# Patient Record
Sex: MALE
[De-identification: names, ages, dates, MRNs, and addresses within clinical notes are randomized per-mention and may not be internally consistent; named-entity substitution may affect disease eponyms.]

---

## 2020-03-16 ENCOUNTER — NURSE TRIAGE (OUTPATIENT)
Dept: OTHER | Facility: CLINIC | Age: 65
End: 2020-03-16

## 2020-03-16 NOTE — TELEPHONE ENCOUNTER
Reason for Disposition   Fever with no signs of serious infection or localizing symptoms    Protocols used: FEVER-ADULT-[place]    Pt calling c/o low grade temp. Over the past week temp has ranged from 97-99. Today temp is at 100. States he feels just fine. No other symptoms, no recent travel. Pt asking if ok to go to work, recommend he contact his employer. Increase fluids, call PCP if temp increases or any new symptoms.

## 2023-03-29 DIAGNOSIS — J45.909 UNCOMPLICATED ASTHMA, UNSPECIFIED ASTHMA SEVERITY, UNSPECIFIED WHETHER PERSISTENT (HHS-HCC): ICD-10-CM

## 2023-03-29 RX ORDER — SILDENAFIL 100 MG/1
TABLET, FILM COATED ORAL
COMMUNITY

## 2023-03-29 RX ORDER — CLOPIDOGREL BISULFATE 75 MG/1
75 TABLET ORAL DAILY
COMMUNITY
End: 2023-10-31

## 2023-03-29 RX ORDER — ASPIRIN 81 MG/1
1 TABLET ORAL DAILY
COMMUNITY

## 2023-03-29 RX ORDER — GLUCOSAM/CHONDRO/HERB 149/HYAL 750-100 MG
TABLET ORAL
COMMUNITY

## 2023-03-29 RX ORDER — CEPHALEXIN 250 MG/1
1 CAPSULE ORAL EVERY 12 HOURS
COMMUNITY
End: 2023-03-29 | Stop reason: SDUPTHER

## 2023-03-29 RX ORDER — ATORVASTATIN CALCIUM 80 MG/1
80 TABLET, FILM COATED ORAL DAILY
COMMUNITY
End: 2023-08-01 | Stop reason: SDUPTHER

## 2023-03-29 RX ORDER — GABAPENTIN 300 MG/1
1 CAPSULE ORAL NIGHTLY
COMMUNITY
Start: 2021-09-16

## 2023-03-29 RX ORDER — NITROGLYCERIN 0.4 MG/1
TABLET SUBLINGUAL
COMMUNITY
Start: 2015-06-02

## 2023-03-29 RX ORDER — VIT C/E/ZN/COPPR/LUTEIN/ZEAXAN 250MG-90MG
CAPSULE ORAL
COMMUNITY

## 2023-03-29 RX ORDER — LEVOTHYROXINE SODIUM 100 UG/1
TABLET ORAL
COMMUNITY

## 2023-03-29 RX ORDER — METOPROLOL TARTRATE 25 MG/1
25 TABLET, FILM COATED ORAL 2 TIMES DAILY
COMMUNITY

## 2023-03-30 RX ORDER — FLUTICASONE PROPIONATE AND SALMETEROL 100; 50 UG/1; UG/1
1 POWDER RESPIRATORY (INHALATION) EVERY 12 HOURS
Qty: 60 EACH | Refills: 3 | Status: SHIPPED | OUTPATIENT
Start: 2023-03-30 | End: 2023-12-08 | Stop reason: SDUPTHER

## 2023-06-17 LAB — PROSTATE SPECIFIC AG (NG/ML) IN SER/PLAS: 3.85 NG/ML (ref 0–4)

## 2023-08-01 ENCOUNTER — OFFICE VISIT (OUTPATIENT)
Dept: PRIMARY CARE | Facility: CLINIC | Age: 68
End: 2023-08-01
Payer: MEDICARE

## 2023-08-01 VITALS
SYSTOLIC BLOOD PRESSURE: 104 MMHG | WEIGHT: 192 LBS | HEIGHT: 70 IN | HEART RATE: 61 BPM | OXYGEN SATURATION: 97 % | RESPIRATION RATE: 16 BRPM | BODY MASS INDEX: 27.49 KG/M2 | DIASTOLIC BLOOD PRESSURE: 63 MMHG

## 2023-08-01 DIAGNOSIS — E78.5 HYPERLIPIDEMIA, UNSPECIFIED HYPERLIPIDEMIA TYPE: ICD-10-CM

## 2023-08-01 DIAGNOSIS — Z00.00 HEALTHCARE MAINTENANCE: Primary | ICD-10-CM

## 2023-08-01 DIAGNOSIS — I25.10 CORONARY ARTERY DISEASE INVOLVING NATIVE HEART WITHOUT ANGINA PECTORIS, UNSPECIFIED VESSEL OR LESION TYPE: ICD-10-CM

## 2023-08-01 PROBLEM — M17.0 OSTEOARTHRITIS OF BOTH KNEES: Status: ACTIVE | Noted: 2023-08-01

## 2023-08-01 PROBLEM — E03.9 HYPOTHYROIDISM: Status: ACTIVE | Noted: 2023-08-01

## 2023-08-01 PROBLEM — N52.9 ED (ERECTILE DYSFUNCTION): Status: ACTIVE | Noted: 2023-08-01

## 2023-08-01 PROBLEM — M25.571 RIGHT ANKLE PAIN: Status: ACTIVE | Noted: 2023-08-01

## 2023-08-01 PROBLEM — R73.9 HYPERGLYCEMIA: Status: ACTIVE | Noted: 2023-08-01

## 2023-08-01 PROBLEM — M70.21 OLECRANON BURSITIS OF RIGHT ELBOW: Status: ACTIVE | Noted: 2023-08-01

## 2023-08-01 PROBLEM — T78.40XA ALLERGIC REACTION: Status: ACTIVE | Noted: 2023-08-01

## 2023-08-01 PROBLEM — M54.59 MECHANICAL LOW BACK PAIN: Status: ACTIVE | Noted: 2023-08-01

## 2023-08-01 PROBLEM — R35.0 FREQUENT URINATION: Status: ACTIVE | Noted: 2023-08-01

## 2023-08-01 PROBLEM — G89.29 CHRONIC PAIN: Status: ACTIVE | Noted: 2023-08-01

## 2023-08-01 PROBLEM — S93.491A SPRAIN OF ANTERIOR TALOFIBULAR LIGAMENT OF RIGHT ANKLE: Status: ACTIVE | Noted: 2023-08-01

## 2023-08-01 PROBLEM — Q74.2 ABNORMALITY OF FEMUR: Status: ACTIVE | Noted: 2023-08-01

## 2023-08-01 PROBLEM — M25.532 PAIN AND SWELLING OF LEFT WRIST: Status: ACTIVE | Noted: 2023-08-01

## 2023-08-01 PROBLEM — L03.113 CELLULITIS OF RIGHT UPPER EXTREMITY: Status: ACTIVE | Noted: 2023-08-01

## 2023-08-01 PROBLEM — R97.20 ELEVATED PSA: Status: ACTIVE | Noted: 2023-08-01

## 2023-08-01 PROBLEM — M17.9 OSTEOARTHROSIS OF KNEE: Status: ACTIVE | Noted: 2023-08-01

## 2023-08-01 PROBLEM — M25.521 RIGHT ELBOW PAIN: Status: ACTIVE | Noted: 2023-08-01

## 2023-08-01 PROBLEM — H57.89 EYE REDNESS: Status: ACTIVE | Noted: 2023-08-01

## 2023-08-01 PROBLEM — R05.9 COUGH: Status: ACTIVE | Noted: 2023-08-01

## 2023-08-01 PROBLEM — M43.10 ACQUIRED SPONDYLOLISTHESIS: Status: ACTIVE | Noted: 2023-08-01

## 2023-08-01 PROBLEM — N41.9 PROSTATITIS: Status: ACTIVE | Noted: 2023-08-01

## 2023-08-01 PROBLEM — M48.061 LUMBAR SPINAL STENOSIS: Status: ACTIVE | Noted: 2023-08-01

## 2023-08-01 PROBLEM — R53.83 FATIGUE: Status: ACTIVE | Noted: 2023-08-01

## 2023-08-01 PROBLEM — M25.569 PAIN IN JOINT, LOWER LEG: Status: ACTIVE | Noted: 2023-08-01

## 2023-08-01 PROBLEM — S50.12XA CONTUSION OF LEFT FOREARM: Status: ACTIVE | Noted: 2023-08-01

## 2023-08-01 PROBLEM — Z96.60 H/O JOINT REPLACEMENT: Status: ACTIVE | Noted: 2023-08-01

## 2023-08-01 PROBLEM — R81 GLYCOSURIA: Status: ACTIVE | Noted: 2023-08-01

## 2023-08-01 PROBLEM — M71.20 BAKER CYST: Status: ACTIVE | Noted: 2023-08-01

## 2023-08-01 PROBLEM — R07.9 CHEST PAIN: Status: ACTIVE | Noted: 2023-08-01

## 2023-08-01 PROBLEM — M25.432 PAIN AND SWELLING OF LEFT WRIST: Status: ACTIVE | Noted: 2023-08-01

## 2023-08-01 PROBLEM — E55.9 VITAMIN D DEFICIENCY: Status: ACTIVE | Noted: 2023-08-01

## 2023-08-01 PROBLEM — M53.3 SACRO-ILIAC PAIN: Status: ACTIVE | Noted: 2023-08-01

## 2023-08-01 PROBLEM — G47.00 INSOMNIA: Status: ACTIVE | Noted: 2023-08-01
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
## 2023-08-01 PROBLEM — M19.042 ARTHRITIS OF HAND, LEFT, DEGENERATIVE: Status: ACTIVE | Noted: 2023-08-01

## 2023-08-01 PROBLEM — J45.909 ASTHMA (HHS-HCC): Status: ACTIVE | Noted: 2023-08-01

## 2023-08-01 PROBLEM — M54.16 LEFT LUMBAR RADICULOPATHY: Status: ACTIVE | Noted: 2023-08-01

## 2023-08-01 PROCEDURE — 1125F AMNT PAIN NOTED PAIN PRSNT: CPT | Performed by: INTERNAL MEDICINE

## 2023-08-01 PROCEDURE — 1036F TOBACCO NON-USER: CPT | Performed by: INTERNAL MEDICINE

## 2023-08-01 PROCEDURE — G0439 PPPS, SUBSEQ VISIT: HCPCS | Performed by: INTERNAL MEDICINE

## 2023-08-01 PROCEDURE — 1159F MED LIST DOCD IN RCRD: CPT | Performed by: INTERNAL MEDICINE

## 2023-08-01 PROCEDURE — 1170F FXNL STATUS ASSESSED: CPT | Performed by: INTERNAL MEDICINE

## 2023-08-01 RX ORDER — ATORVASTATIN CALCIUM 80 MG/1
80 TABLET, FILM COATED ORAL DAILY
Qty: 90 TABLET | Refills: 3 | Status: SHIPPED | OUTPATIENT
Start: 2023-08-01 | End: 2024-02-22 | Stop reason: SDUPTHER

## 2023-08-01 RX ORDER — TADALAFIL 5 MG/1
5 TABLET ORAL DAILY
COMMUNITY
Start: 2023-06-23 | End: 2024-01-09 | Stop reason: SDUPTHER

## 2023-08-01 RX ORDER — MELOXICAM 15 MG/1
15 TABLET ORAL DAILY PRN
COMMUNITY
Start: 2023-05-18

## 2023-08-01 RX ORDER — TRAMADOL HYDROCHLORIDE 50 MG/1
TABLET ORAL
COMMUNITY
Start: 2023-07-13

## 2023-08-01 ASSESSMENT — ACTIVITIES OF DAILY LIVING (ADL)
BATHING: INDEPENDENT
DOING_HOUSEWORK: INDEPENDENT
MANAGING_FINANCES: INDEPENDENT
GROCERY_SHOPPING: INDEPENDENT
TAKING_MEDICATION: INDEPENDENT
DRESSING: INDEPENDENT

## 2023-08-01 ASSESSMENT — PATIENT HEALTH QUESTIONNAIRE - PHQ9
2. FEELING DOWN, DEPRESSED OR HOPELESS: NOT AT ALL
1. LITTLE INTEREST OR PLEASURE IN DOING THINGS: NOT AT ALL
1. LITTLE INTEREST OR PLEASURE IN DOING THINGS: NOT AT ALL
SUM OF ALL RESPONSES TO PHQ9 QUESTIONS 1 AND 2: 0
SUM OF ALL RESPONSES TO PHQ9 QUESTIONS 1 AND 2: 0
2. FEELING DOWN, DEPRESSED OR HOPELESS: NOT AT ALL

## 2023-08-01 ASSESSMENT — ENCOUNTER SYMPTOMS
LOSS OF SENSATION IN FEET: 0
OCCASIONAL FEELINGS OF UNSTEADINESS: 0
DEPRESSION: 0

## 2023-08-01 NOTE — PROGRESS NOTES
"Subjective   Reason for Visit: Zan Reed is an 67 y.o. male here for a Medicare Wellness visit.     67 M    L hip surgery planned for later this month with Dr Linus MARNIO                 Reviewed all medications by prescribing practitioner or clinical pharmacist (such as prescriptions, OTCs, herbal therapies and supplements) and documented in the medical record.    HPI    Patient Care Team:  Raj Romero MD as PCP - General  Kelly Evans MD PhD as PCP - [facility] Attributed Provider     Review of Systems      No Fever/chills/headaches/dizziness/chest pains/ shortness of breath/palpitations/Nausea/vomiting/diarrhea/ constipation/urine frequency/blood in urine.            Objective   Vitals:  /63 (BP Location: Left arm, Patient Position: Sitting, BP Cuff Size: Adult)   Pulse 61   Resp 16   Ht 1.775 m (5' 9.88\")   Wt 87.1 kg (192 lb)   SpO2 97%   BMI 27.64 kg/m²       Physical Exam    No JVP elevation. No palpable Lymph Nodes. No Thyromegaly    CVS-NL S1/S2 . No MRG    Lungs-CTA. B/S= B/L    Abdomen-Soft, Non-tender. No masses or HSM    Extremities: No C/C/E    No masses or hernia. Normal prostate     Assessment/Plan   Problem List Items Addressed This Visit          Cardiac and Vasculature    Hyperlipidemia - Primary    Relevant Medications    atorvastatin (Lipitor) 80 mg tablet     Other Visit Diagnoses       Healthcare maintenance        Relevant Orders    CBC and Auto Differential    Comprehensive Metabolic Panel    TSH with reflex to Free T4 if abnormal    Lipid Panel    Urinalysis with Reflex Microscopic    Prostate Specific Antigen, Screen    Hemoglobin A1c          67 M    L hip surgery planned for later this month with Dr Linus MARINO    Rhinophyma/Rosacea    Plan:    Labs    Follow up with Cardiology for annual surveillance    Awaiting THR with Dr Barriga later this month    Continue with current Rx    Follow up/ Call with any concerns    Follow up in 12 " months /PRN

## 2023-08-08 ENCOUNTER — LAB (OUTPATIENT)
Dept: LAB | Facility: LAB | Age: 68
End: 2023-08-08
Payer: MEDICARE

## 2023-08-08 DIAGNOSIS — Z00.00 HEALTHCARE MAINTENANCE: ICD-10-CM

## 2023-08-08 LAB
ALANINE AMINOTRANSFERASE (SGPT) (U/L) IN SER/PLAS: 21 U/L (ref 10–52)
ALBUMIN (G/DL) IN SER/PLAS: 4.2 G/DL (ref 3.4–5)
ALKALINE PHOSPHATASE (U/L) IN SER/PLAS: 82 U/L (ref 33–136)
ANION GAP IN SER/PLAS: 10 MMOL/L (ref 10–20)
APPEARANCE, URINE: CLEAR
ASPARTATE AMINOTRANSFERASE (SGOT) (U/L) IN SER/PLAS: 18 U/L (ref 9–39)
BASOPHILS (10*3/UL) IN BLOOD BY AUTOMATED COUNT: 0.01 X10E9/L (ref 0–0.1)
BASOPHILS/100 LEUKOCYTES IN BLOOD BY AUTOMATED COUNT: 0.2 % (ref 0–2)
BILIRUBIN TOTAL (MG/DL) IN SER/PLAS: 0.8 MG/DL (ref 0–1.2)
BILIRUBIN, URINE: NEGATIVE
BLOOD, URINE: NEGATIVE
CALCIUM (MG/DL) IN SER/PLAS: 9.1 MG/DL (ref 8.6–10.3)
CARBON DIOXIDE, TOTAL (MMOL/L) IN SER/PLAS: 30 MMOL/L (ref 21–32)
CHLORIDE (MMOL/L) IN SER/PLAS: 105 MMOL/L (ref 98–107)
CHOLESTEROL (MG/DL) IN SER/PLAS: 97 MG/DL (ref 0–199)
CHOLESTEROL IN HDL (MG/DL) IN SER/PLAS: 38.2 MG/DL
CHOLESTEROL/HDL RATIO: 2.5
COLOR, URINE: YELLOW
CREATININE (MG/DL) IN SER/PLAS: 1.06 MG/DL (ref 0.5–1.3)
EOSINOPHILS (10*3/UL) IN BLOOD BY AUTOMATED COUNT: 0.14 X10E9/L (ref 0–0.7)
EOSINOPHILS/100 LEUKOCYTES IN BLOOD BY AUTOMATED COUNT: 3.2 % (ref 0–6)
ERYTHROCYTE DISTRIBUTION WIDTH (RATIO) BY AUTOMATED COUNT: 13.4 % (ref 11.5–14.5)
ERYTHROCYTE MEAN CORPUSCULAR HEMOGLOBIN CONCENTRATION (G/DL) BY AUTOMATED: 33.4 G/DL (ref 32–36)
ERYTHROCYTE MEAN CORPUSCULAR VOLUME (FL) BY AUTOMATED COUNT: 81 FL (ref 80–100)
ERYTHROCYTES (10*6/UL) IN BLOOD BY AUTOMATED COUNT: 5.34 X10E12/L (ref 4.5–5.9)
ESTIMATED AVERAGE GLUCOSE FOR HBA1C: 111 MG/DL
GFR MALE: 77 ML/MIN/1.73M2
GLUCOSE (MG/DL) IN SER/PLAS: 111 MG/DL (ref 74–99)
GLUCOSE, URINE: ABNORMAL MG/DL
HEMATOCRIT (%) IN BLOOD BY AUTOMATED COUNT: 43.4 % (ref 41–52)
HEMOGLOBIN (G/DL) IN BLOOD: 14.5 G/DL (ref 13.5–17.5)
HEMOGLOBIN A1C/HEMOGLOBIN TOTAL IN BLOOD: 5.5 %
IMMATURE GRANULOCYTES/100 LEUKOCYTES IN BLOOD BY AUTOMATED COUNT: 0.2 % (ref 0–0.9)
KETONES, URINE: NEGATIVE MG/DL
LDL: 40 MG/DL (ref 0–99)
LEUKOCYTE ESTERASE, URINE: NEGATIVE
LEUKOCYTES (10*3/UL) IN BLOOD BY AUTOMATED COUNT: 4.4 X10E9/L (ref 4.4–11.3)
LYMPHOCYTES (10*3/UL) IN BLOOD BY AUTOMATED COUNT: 1.27 X10E9/L (ref 1.2–4.8)
LYMPHOCYTES/100 LEUKOCYTES IN BLOOD BY AUTOMATED COUNT: 29.1 % (ref 13–44)
MONOCYTES (10*3/UL) IN BLOOD BY AUTOMATED COUNT: 0.35 X10E9/L (ref 0.1–1)
MONOCYTES/100 LEUKOCYTES IN BLOOD BY AUTOMATED COUNT: 8 % (ref 2–10)
NEUTROPHILS (10*3/UL) IN BLOOD BY AUTOMATED COUNT: 2.58 X10E9/L (ref 1.2–7.7)
NEUTROPHILS/100 LEUKOCYTES IN BLOOD BY AUTOMATED COUNT: 59.3 % (ref 40–80)
NITRITE, URINE: NEGATIVE
PH, URINE: 6 (ref 5–8)
PLATELETS (10*3/UL) IN BLOOD AUTOMATED COUNT: 138 X10E9/L (ref 150–450)
POTASSIUM (MMOL/L) IN SER/PLAS: 4.3 MMOL/L (ref 3.5–5.3)
PROTEIN TOTAL: 6.6 G/DL (ref 6.4–8.2)
PROTEIN, URINE: NEGATIVE MG/DL
SODIUM (MMOL/L) IN SER/PLAS: 141 MMOL/L (ref 136–145)
SPECIFIC GRAVITY, URINE: 1.02 (ref 1–1.03)
THYROTROPIN (MIU/L) IN SER/PLAS BY DETECTION LIMIT <= 0.05 MIU/L: 2.38 MIU/L (ref 0.44–3.98)
TRIGLYCERIDE (MG/DL) IN SER/PLAS: 95 MG/DL (ref 0–149)
UREA NITROGEN (MG/DL) IN SER/PLAS: 12 MG/DL (ref 6–23)
UROBILINOGEN, URINE: <2 MG/DL (ref 0–1.9)
VLDL: 19 MG/DL (ref 0–40)

## 2023-08-08 PROCEDURE — 81003 URINALYSIS AUTO W/O SCOPE: CPT

## 2023-08-08 PROCEDURE — 84443 ASSAY THYROID STIM HORMONE: CPT

## 2023-08-08 PROCEDURE — 80053 COMPREHEN METABOLIC PANEL: CPT

## 2023-08-08 PROCEDURE — 80061 LIPID PANEL: CPT

## 2023-08-08 PROCEDURE — 83036 HEMOGLOBIN GLYCOSYLATED A1C: CPT

## 2023-08-08 PROCEDURE — 36415 COLL VENOUS BLD VENIPUNCTURE: CPT

## 2023-08-08 PROCEDURE — 85025 COMPLETE CBC W/AUTO DIFF WBC: CPT

## 2023-08-19 LAB — STAPH/MRSA SCREEN, CULTURE: NORMAL

## 2023-08-29 ENCOUNTER — HOSPITAL ENCOUNTER (OUTPATIENT)
Dept: DATA CONVERSION | Facility: HOSPITAL | Age: 68
End: 2023-08-30
Attending: ORTHOPAEDIC SURGERY | Admitting: ORTHOPAEDIC SURGERY
Payer: MEDICARE

## 2023-08-29 DIAGNOSIS — M16.12 UNILATERAL PRIMARY OSTEOARTHRITIS, LEFT HIP: ICD-10-CM

## 2023-08-30 LAB
ANION GAP IN SER/PLAS: 12 MMOL/L (ref 10–20)
BASOPHILS (10*3/UL) IN BLOOD BY AUTOMATED COUNT: 0.01 X10E9/L (ref 0–0.1)
BASOPHILS/100 LEUKOCYTES IN BLOOD BY AUTOMATED COUNT: 0.1 % (ref 0–2)
CALCIUM (MG/DL) IN SER/PLAS: 7.6 MG/DL (ref 8.6–10.3)
CARBON DIOXIDE, TOTAL (MMOL/L) IN SER/PLAS: 25 MMOL/L (ref 21–32)
CHLORIDE (MMOL/L) IN SER/PLAS: 104 MMOL/L (ref 98–107)
CREATININE (MG/DL) IN SER/PLAS: 0.95 MG/DL (ref 0.5–1.3)
EOSINOPHILS (10*3/UL) IN BLOOD BY AUTOMATED COUNT: 0.01 X10E9/L (ref 0–0.7)
EOSINOPHILS/100 LEUKOCYTES IN BLOOD BY AUTOMATED COUNT: 0.1 % (ref 0–6)
ERYTHROCYTE DISTRIBUTION WIDTH (RATIO) BY AUTOMATED COUNT: 13.2 % (ref 11.5–14.5)
ERYTHROCYTE MEAN CORPUSCULAR HEMOGLOBIN CONCENTRATION (G/DL) BY AUTOMATED: 34.3 G/DL (ref 32–36)
ERYTHROCYTE MEAN CORPUSCULAR VOLUME (FL) BY AUTOMATED COUNT: 81 FL (ref 80–100)
ERYTHROCYTES (10*6/UL) IN BLOOD BY AUTOMATED COUNT: 3.83 X10E12/L (ref 4.5–5.9)
GFR MALE: 88 ML/MIN/1.73M2
GLUCOSE (MG/DL) IN SER/PLAS: 155 MG/DL (ref 74–99)
HEMATOCRIT (%) IN BLOOD BY AUTOMATED COUNT: 30.9 % (ref 41–52)
HEMOGLOBIN (G/DL) IN BLOOD: 10.6 G/DL (ref 13.5–17.5)
IMMATURE GRANULOCYTES/100 LEUKOCYTES IN BLOOD BY AUTOMATED COUNT: 0.5 % (ref 0–0.9)
LEUKOCYTES (10*3/UL) IN BLOOD BY AUTOMATED COUNT: 8.3 X10E9/L (ref 4.4–11.3)
LYMPHOCYTES (10*3/UL) IN BLOOD BY AUTOMATED COUNT: 0.8 X10E9/L (ref 1.2–4.8)
LYMPHOCYTES/100 LEUKOCYTES IN BLOOD BY AUTOMATED COUNT: 9.7 % (ref 13–44)
MONOCYTES (10*3/UL) IN BLOOD BY AUTOMATED COUNT: 0.79 X10E9/L (ref 0.1–1)
MONOCYTES/100 LEUKOCYTES IN BLOOD BY AUTOMATED COUNT: 9.5 % (ref 2–10)
NEUTROPHILS (10*3/UL) IN BLOOD BY AUTOMATED COUNT: 6.63 X10E9/L (ref 1.2–7.7)
NEUTROPHILS/100 LEUKOCYTES IN BLOOD BY AUTOMATED COUNT: 80.1 % (ref 40–80)
PLATELETS (10*3/UL) IN BLOOD AUTOMATED COUNT: 125 X10E9/L (ref 150–450)
POTASSIUM (MMOL/L) IN SER/PLAS: 3.5 MMOL/L (ref 3.5–5.3)
SODIUM (MMOL/L) IN SER/PLAS: 137 MMOL/L (ref 136–145)
UREA NITROGEN (MG/DL) IN SER/PLAS: 13 MG/DL (ref 6–23)

## 2023-08-31 LAB
ANION GAP IN SER/PLAS: NORMAL
BASOPHILS (10*3/UL) IN BLOOD BY AUTOMATED COUNT: NORMAL
BASOPHILS/100 LEUKOCYTES IN BLOOD BY AUTOMATED COUNT: NORMAL
CALCIUM (MG/DL) IN SER/PLAS: NORMAL
CARBON DIOXIDE, TOTAL (MMOL/L) IN SER/PLAS: NORMAL
CHLORIDE (MMOL/L) IN SER/PLAS: NORMAL
CREATININE (MG/DL) IN SER/PLAS: NORMAL
EOSINOPHILS (10*3/UL) IN BLOOD BY AUTOMATED COUNT: NORMAL
EOSINOPHILS/100 LEUKOCYTES IN BLOOD BY AUTOMATED COUNT: NORMAL
ERYTHROCYTE DISTRIBUTION WIDTH (RATIO) BY AUTOMATED COUNT: NORMAL
ERYTHROCYTE MEAN CORPUSCULAR HEMOGLOBIN CONCENTRATION (G/DL) BY AUTOMATED: NORMAL
ERYTHROCYTE MEAN CORPUSCULAR VOLUME (FL) BY AUTOMATED COUNT: NORMAL
ERYTHROCYTES (10*6/UL) IN BLOOD BY AUTOMATED COUNT: NORMAL
GFR FEMALE: NORMAL
GFR MALE: NORMAL
GLUCOSE (MG/DL) IN SER/PLAS: NORMAL
HEMATOCRIT (%) IN BLOOD BY AUTOMATED COUNT: NORMAL
HEMOGLOBIN (G/DL) IN BLOOD: NORMAL
IMMATURE GRANULOCYTES/100 LEUKOCYTES IN BLOOD BY AUTOMATED COUNT: NORMAL
LEUKOCYTES (10*3/UL) IN BLOOD BY AUTOMATED COUNT: NORMAL
LYMPHOCYTES (10*3/UL) IN BLOOD BY AUTOMATED COUNT: NORMAL
LYMPHOCYTES/100 LEUKOCYTES IN BLOOD BY AUTOMATED COUNT: NORMAL
MANUAL DIFFERENTIAL Y/N: NORMAL
MONOCYTES (10*3/UL) IN BLOOD BY AUTOMATED COUNT: NORMAL
MONOCYTES/100 LEUKOCYTES IN BLOOD BY AUTOMATED COUNT: NORMAL
NEUTROPHILS (10*3/UL) IN BLOOD BY AUTOMATED COUNT: NORMAL
NEUTROPHILS/100 LEUKOCYTES IN BLOOD BY AUTOMATED COUNT: NORMAL
NRBC (PER 100 WBCS) BY AUTOMATED COUNT: NORMAL
PLATELETS (10*3/UL) IN BLOOD AUTOMATED COUNT: NORMAL
POTASSIUM (MMOL/L) IN SER/PLAS: NORMAL
SODIUM (MMOL/L) IN SER/PLAS: NORMAL
UREA NITROGEN (MG/DL) IN SER/PLAS: NORMAL

## 2023-09-09 PROBLEM — N40.1 BPH WITH OBSTRUCTION/LOWER URINARY TRACT SYMPTOMS: Status: ACTIVE | Noted: 2023-09-09

## 2023-09-09 PROBLEM — L98.9 DERMATOSIS: Status: ACTIVE | Noted: 2023-09-09

## 2023-09-09 PROBLEM — R53.82 CHRONIC FATIGUE: Status: ACTIVE | Noted: 2019-11-29

## 2023-09-09 PROBLEM — E66.3 OVERWEIGHT WITH BODY MASS INDEX (BMI) OF 27 TO 27.9 IN ADULT: Status: ACTIVE | Noted: 2023-09-09

## 2023-09-09 PROBLEM — R33.9 INCOMPLETE EMPTYING OF BLADDER: Status: ACTIVE | Noted: 2023-09-09

## 2023-09-09 PROBLEM — R73.01 ELEVATED FASTING BLOOD SUGAR: Status: ACTIVE | Noted: 2023-09-09

## 2023-09-09 PROBLEM — M16.12 ARTHRITIS OF LEFT HIP: Status: ACTIVE | Noted: 2023-09-09

## 2023-09-09 PROBLEM — M54.50 LOW BACK PAIN: Status: ACTIVE | Noted: 2023-09-09

## 2023-09-09 PROBLEM — R73.9 BLOOD GLUCOSE ELEVATED: Status: ACTIVE | Noted: 2023-09-09

## 2023-09-09 PROBLEM — M54.59 OTHER LOW BACK PAIN: Status: ACTIVE | Noted: 2023-09-09

## 2023-09-09 PROBLEM — S50.12XA: Status: ACTIVE | Noted: 2023-09-09

## 2023-09-09 PROBLEM — N13.8 BPH WITH OBSTRUCTION/LOWER URINARY TRACT SYMPTOMS: Status: ACTIVE | Noted: 2023-09-09

## 2023-09-09 RX ORDER — TADALAFIL 20 MG/1
20 TABLET ORAL AS NEEDED
COMMUNITY
End: 2023-12-08 | Stop reason: SDUPTHER

## 2023-09-09 RX ORDER — METRONIDAZOLE 7.5 MG/G
LOTION TOPICAL
COMMUNITY
Start: 2023-08-24

## 2023-09-09 RX ORDER — DOXYCYCLINE HYCLATE 50 MG/1
CAPSULE ORAL
COMMUNITY
Start: 2023-08-24

## 2023-09-09 RX ORDER — DOXYCYCLINE HYCLATE 100 MG
TABLET ORAL
COMMUNITY
Start: 2023-03-31

## 2023-09-09 RX ORDER — FERROUS SULFATE, DRIED 160(50) MG
1 TABLET, EXTENDED RELEASE ORAL DAILY
COMMUNITY
Start: 2019-04-09

## 2023-09-09 RX ORDER — GUAIFENESIN AND DEXTROMETHORPHAN HYDROBROMIDE 600; 30 MG/1; MG/1
1-2 TABLET, EXTENDED RELEASE ORAL EVERY 12 HOURS PRN
COMMUNITY

## 2023-09-09 RX ORDER — SULFAMETHOXAZOLE AND TRIMETHOPRIM 800; 160 MG/1; MG/1
1 TABLET ORAL 2 TIMES DAILY
COMMUNITY
Start: 2023-08-03

## 2023-09-09 RX ORDER — CHLORHEXIDINE GLUCONATE ORAL RINSE 1.2 MG/ML
15 SOLUTION DENTAL DAILY
COMMUNITY
Start: 2023-08-17

## 2023-09-09 RX ORDER — LEVOTHYROXINE SODIUM 150 UG/1
150 TABLET ORAL DAILY
COMMUNITY

## 2023-09-09 RX ORDER — ALBUTEROL SULFATE 90 UG/1
AEROSOL, METERED RESPIRATORY (INHALATION) AS NEEDED
COMMUNITY

## 2023-09-09 RX ORDER — AZITHROMYCIN 250 MG/1
TABLET, FILM COATED ORAL
COMMUNITY
End: 2023-09-12 | Stop reason: ENTERED-IN-ERROR

## 2023-09-29 VITALS
HEIGHT: 70 IN | DIASTOLIC BLOOD PRESSURE: 70 MMHG | SYSTOLIC BLOOD PRESSURE: 135 MMHG | WEIGHT: 186.73 LBS | BODY MASS INDEX: 26.73 KG/M2

## 2023-09-30 NOTE — DISCHARGE SUMMARY
Send Summary:   Discharge Summary Providers:  Provider Role Provider Name   · Primary Raj Romero       Note Recipients: Raj Romero MD - [id number]  [Preferred]       Discharge:    Summary:   Admission Date: .29-Aug-2023 09:08:00   Discharge Date: 30-Aug-2023   Admission Reason: elective left total hip replacement   Final Discharge Diagnoses: s/p elective left total  hip replacement   Procedures: Date: 29-Aug-2023 12:40:00  Procedure Name: 1. Left Hip Total Replacement   Vital Signs:        T   P  R  BP   MAP  SpO2   Value  36.6  77  17  117/65      95%  Date/Time 8/30 8:11 8/30 8:11 8/30 8:11 8/30 8:11    8/30 8:11  Range  (36.4C - 36.6C )  (63 - 87 )  (16 - 17 )  (110 - 147 )/ (62 - 81 )    (92% - 98% )    Date:            Weight/Scale Type:  Height:   29-Aug-2023 14:26  84.7  kg / standing  177.8  cm  Physical Exam:    Physical Exam:  Constitutional: Well nourished, A&Ox3, NAD, Cooperative   Eyes: No icterus or conjunctival injection  ENMT: Moist mucous membranes  Head/Neck: Neck supple  Respiratory/Thorax: Patent airways, equal and symmetrical chest expansion, on RA  Cardiovascular: RRR  Gastrointestinal: Abdomen soft, non-distended, tolerating diet  Genitourinary: voiding without difficulty  Musculoskeletal: LLE: Mepilex c/d/i. No swelling or hematoma noted. Neurovascularly in tact. 2+ DP. Good strength. Cap refill <2 sec.  Neurological: No focal neurologic deficits noted.  Hospital Course:    Patient is now s/p LTHA with Dr. Barriga on 8/29/23. On the day of surgery, patient was identified in the pre-operative holding area and agreeable to proceed with  surgery. Written consent was obtained.  Please see operative note for further details of this procedure. Patient received 24 hours of davy-operative antibiotics. Patient recovered in the PACU before transfer to a regular nursing floor. Patient was started  on oxycodone and tylenol for pain control and anticoagulant for DVT prophylaxis. On the day  of discharge, patient was afebrile with stable vital signs. Patient was neurovascularly intact at time of discharge. Patient was discharged with prescription of  anticoagulant for DVT prophylaxis for 4 weeks.      Discharge Information:    and Continuing Care:   Lab Results - Pending:    None  Radiology Results - Pending: None   Discharge Instructions:    Activity:           May shower..            May not drive while taking narcotics.            No pushing, pulling, or lifting objects greater than 10 pounds.            Weight-bearing Instructions: full weight bearing.      Nutrition/Diet:           resume normal diet    Wound Care:           Wound Type:   surgical incision          Cleanse With:   soap and water          Other Instructions:   You may removed Mediplex bandage one week after your procedure. Monitor for signs of gross bleeding or infection.    Additional Orders:           Additional Instructions:   Resume normal diet for you.  Take prescribed Aspirin as directed for prevention of blood clots.   Take medications as prescribed. Resume Plavix 5 days post-op, 9/3.  Activity and full weight bearing as tolerated  Apply KARUNA hose or ACE bandages to both lower legs x 6 weeks; remove at night.  Ice your extremity per PT instructions.  May shower with waterproof bandage. Home care will remove in 6-7 days.  Wound may be open to air when dry. If there is continued drainage, cover with an abdominal pad and ACE wrap.   Let water run freely over incision when showering, do not scrub. steri strips will fall off in 1-2 weeks. if after 2 weeks they, have not, gently  peel them off.  If the patient has staples in place, homecare to remove in 2 weeks post-op.  Do not soak in bath tub, hot tub, pool, lake, pond for 8 weeks.  No lotions, creams, ointments for 6 weeks to the operative leg.  No driving while on pain medicine or have been cleared by PA or Surgeon.  Do not visit the dentist until 3 months after the surgery date  unless an emergency.  You will need to take an antibiotic previous to any dental procedures.  Call our office and request a prescription for antibiotics for previous to these procedures lifelong.    Call if any drainage after 7 days, increased redness/warmth/swelling at incision site, pain/tenderness of calf, swelling of calf that does not respond to elevation or fever 101.5  Go to ER or call 911 if you are experiencing chest pain or difficulty breathing.    MEDICATION SIDE EFFECTS.  OXYCODONE: constipation, nausea, vomiting, upset stomach, (sleepiness), dizziness, lightheadedness, itching, headache, blurred vision, dry mouth, sweating  TRAMADOL: headache, dizziness, drowsiness, tired feeling; constipation, diarrhea, nausea, vomiting, stomach pain; feeling nervous or anxious, itching, sweating, flushing.    Follow-up with Dr. Barriga or Liliya Mead PA-C as scheduled in 6 weeks.  [phone] - Call to Confirm     Pain medicine refills call [phone]    **home going medications sent to pharmacy on file**      Home Care Certification:           Home Care Agency:    Home Team [phone]          Skilled Disciplines Ordered:   PT,  OT    Home Care Services:           Home Care Skilled Service:   Rehab (PT/OT/SP eval and treat)    Discharge Medications: Home Medication   Advair Diskus 100 mcg-50 mcg inhalation powder - 1 puff(s) inhaled once a day   Levoxyl 100 mcg (0.1 mg) oral tablet - 1 tab(s) orally once a day  atorvastatin 80 mg oral tablet - 1 tab(s) orally once a day  multivitamin -   1 tablet orally daily  Plavix 75 mg oral tablet - 1 tab(s) orally once a day    Resume 9/3  acetaminophen 500 mg oral tablet - 1 tab(s) orally every 6 hours -.Meds to Beds   Aspirin Enteric Coated 81 mg oral delayed release tablet - 1 tab(s) orally 2 times a day -.Meds to Beds   Colace 100 mg oral capsule - 1 cap(s) orally 2 times a day -.Meds to Beds   oxyCODONE 5 mg oral tablet - 1 tab(s) orally every 6 hours -.Meds  to Beds  or PRN forn pain   pantoprazole 40 mg oral delayed release tablet - 1 tab(s) orally once a day -.Meds to Beds   traMADol 50 mg oral tablet - 1 tab(s) orally every 6 hours -.Meds to Beds  as needed for pain   metoprolol tartrate 25 mg oral tablet - 0.5 tab(s) orally 2 times a day  tadalafil 5 mg oral tablet - 1 tab(s) orally once a day  Vitamin D3 50 mcg (2000 intl units) oral capsule - 1 cap(s) orally once a day     PRN Medication   nitroglycerin 0.4 mg sublingual tablet - 1 tab(s) sublingual prn, As Needed     DNR Status:   ·  Code Status Code Status order at time of discharge: Full Code       Electronic Signatures:  Christopher Cruz (PAC)  (Signed 30-Aug-2023 11:15)   Authored: Send Summary, Summary Content, Ongoing Care,  DNR Status, Note Completion      Last Updated: 30-Aug-2023 11:15 by Christopher Cruz (PAC)

## 2023-10-01 NOTE — OP NOTE
PROCEDURE DETAILS    Preoperative Diagnosis:  Unilateral osteoarthritis of hip, left, M16.12    Postoperative Diagnosis:  Unilateral osteoarthritis of hip, left, M16.12    Surgeon: King Barriga  Resident/Fellow/Other Assistant: Teddy Byers    Procedure:  1. Left Hip Total Replacement    Anesthesia: Quique, Nicole  Estimated Blood Loss: 100  Findings: advanced OA  Specimens(s) Collected: no,     Complications: none  Additional Details: WBAT, ASA        Operative Report:   PREOPERATIVE DIAGNOSIS:  left hip osteoarthritis     POSTOPERATIVE DIAGNOSIS:  left hip osteoarthritis     OPERATION/PROCEDURE:  left total hip arthroplasty     SURGEON:  King Barriga MD     ASSISTANT(S):  [place]    The patient's surgery was assisted by TIFFANY Vargas, due to lack of availability of a qualified resident to assist with the surgery.  Liliya Mead was present for the essential parts of the procedure.  She acted as first assistant and assisted with  preparing the leg, draping the leg, exposing the knee, retracting and manipulating the leg during surgery, facilitating safe performance of the procedure, and closure of the wound.     ANESTHESIA:  Spinal.     ESTIMATED BLOOD LOSS AND INTRAVENOUS FLUIDS:  Please see Anesthesia record.     LOCATION:  [facility]     COMPONENTS USED:  1.  [place] Gription acetabular sector shell 58  2.  [place] femoral stem tapered with Por[place]oat, size 5 HI  3.  [place] AltrX polyethylene acetabular liner, neutral, 36/58  4.  Biolox Delta ceramic femoral head +1.5     BRIEF CLINICAL NOTE:  The patient is a 66 yo male with severe radiographic  osteoarthritis of the left hip.  They failed conservative treatment  and wished to proceed with total hip arthroplasty, which is indicated  at this time.  We discussed the risks, benefits, alternatives of  surgery including, but not limited to, infection, damage to vessel or  nerve, bleeding, soft tissue pain, DVT, PE, problems with  anesthesia,  leg length discrepancy, dislocation, continued soft tissue pain, lack  of range of motion, need for further surgery, etc.  Consent was  obtained.  They were taken to the operating room in order to undergo  the procedure.     OPERATIVE REPORT:  The patient was transferred to the operating room table.  Time-out  was performed confirming patient name, medical record number,  surgical site, and adequate and appropriate imaging.  The patient  received appropriate IV antibiotics as well as tranexamic acid prior  to the start of the procedure.  Once we prepped and draped,  posterolateral approach to the hip was performed.  The skin and  subcutaneous tissues were incised sharply.  Hemostasis was obtained  using electrocautery.  The underlying gluteal fascia was identified  and entered using electrocautery followed by Gan scissors.  Charnley  bow was placed.  The short external rotators and capsule were taken  down in one piece and tagged for later reapproximation making sure to protect the sciatic nerve.  The hip was dislocated.  Provisional femoral neck cut was performed.  The femoral head was removed.  Thye had extensive degenerative changes bipolarly.   The acetabulum was exposed.  We reamed until we had interference fit and placed a Gription shell in appropriate anteversion and inclination.  Two screws were placed to the cup in the pelvis.  Neutral trial liner was placed.  We turned our attention back  to the femur.  The femur was sequentially reamed and broached until we had proximal fit and fill. We then trialed with multiple neck lengths and offsets.  They were stable in position of sleep, flexion, internalrotation, did not impinge in external rotation.   I was happy with theposition of the components and the stability of the hip.  All trial components were removed.  The wound was thoroughly irrigated.  The real polyethylene liner was  placed.  The stem was seated to the level of maricel and the head was  joined with the trunion. The hip was relocated.  The short external rotators and capsule were reapproximated to the trochanter through drill holes  using #5 Ethibond.  The fascia was closed with interrupted 0 Vicryl.  The subcu was closed with interrupted 2-0 Vicryl, and the skin was closed running 3-0 Biosyn followed by Dermabond and Steri-Strips.  Dry  sterile dressing was placed.  The patient was transferred back to the hospital bed without evidence of complication.  They will be weightbearing as tolerated.  They will be on ASA and SCDs for DVT  prophylaxis.                        Attestation:   Note Completion:  Attending Attestation I performed the procedure without a resident         Electronic Signatures:  King Barriga)  (Signed 29-Aug-2023 12:42)   Authored: Post-Operative Note, Chart Review, Note Completion      Last Updated: 29-Aug-2023 12:42 by King Barriga)

## 2023-10-03 ENCOUNTER — EVALUATION (OUTPATIENT)
Dept: PHYSICAL THERAPY | Facility: CLINIC | Age: 68
End: 2023-10-03
Payer: MEDICARE

## 2023-10-03 DIAGNOSIS — Z96.642 STATUS POST TOTAL REPLACEMENT OF LEFT HIP: Primary | ICD-10-CM

## 2023-10-03 PROCEDURE — 97161 PT EVAL LOW COMPLEX 20 MIN: CPT | Mod: GP | Performed by: PHYSICAL THERAPIST

## 2023-10-03 PROCEDURE — 97110 THERAPEUTIC EXERCISES: CPT | Mod: GP | Performed by: PHYSICAL THERAPIST

## 2023-10-03 ASSESSMENT — PATIENT HEALTH QUESTIONNAIRE - PHQ9
SUM OF ALL RESPONSES TO PHQ9 QUESTIONS 1 AND 2: 0
2. FEELING DOWN, DEPRESSED OR HOPELESS: NOT AT ALL
1. LITTLE INTEREST OR PLEASURE IN DOING THINGS: NOT AT ALL

## 2023-10-03 ASSESSMENT — ENCOUNTER SYMPTOMS
DEPRESSION: 0
OCCASIONAL FEELINGS OF UNSTEADINESS: 0
LOSS OF SENSATION IN FEET: 0

## 2023-10-03 ASSESSMENT — PAIN SCALES - GENERAL: PAINLEVEL_OUTOF10: 0 - NO PAIN

## 2023-10-03 ASSESSMENT — PAIN - FUNCTIONAL ASSESSMENT: PAIN_FUNCTIONAL_ASSESSMENT: 0-10

## 2023-10-03 NOTE — PROGRESS NOTES
Physical Therapy    Physical Therapy Evaluation and Treatment      Patient Name: Zan Reed  MRN: [id number]  Today's Date: 10/3/2023  Time Calculation  Start Time: 1210  Stop Time: 1310  Time Calculation (min): 60 min      Visit #: 1  Approved number of visits: med ashu  Auth Required No    Cert dates:10/3/23-12/12/23        Current Problem:   1. Status post total replacement of left hip  Follow Up In Physical Therapy          Subjective         Precautions:  Precautions  Precautions Comment: none       Pain:  Pain Assessment  Pain Assessment: 0-10  Pain Score: 0 - No pain  At worst 2-3 with too much bending over, achy around incision      History:  Chief complaint:   Left BLANCA 8/29/23 Posterior approach. Home health PT ended last week.  Still feels like he has end range of motion exercises like tying his shoe and put on his sock. BUT unsure if he can really progress this due to post op restrictions. Currently not really using cane but keep it in car if needed.      Relevant PMH:  History of low back pain with injections and eventually progressed to hip issues.     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    Follow up with surgeon 10/12/23    Objective:    Flexibility: Major loss bilateral hamstring flexibility, adductors, and quad flexibility (also limited by TKE range)      Strength (MMT):    Hip flexion: R 5, L 4  Hip abduction: R 4+, L 4-  Hip Extension: R 4, L 4-  Hip IR: R 4, L 4-  Hip ER: R 4, L 4-    Knee extension: R WNL, L WNL  Knee flexion: R WNL, L WNL        Range of Motion (degrees of motion):      Hip flexion: R 120, L 90  Hip abduction: R 20, L  10  Hip IR: R WNL, L NT  Hip ER: R WNL, L 20        Palpation/other: Incision healing well without sign of infection.  Long stitch at both ends of incision removed via sterile tweezers today. Education on scar massage.  Gait: pt ambulates without deviation in gait or assistive device  Posture: pt sits with left leg crossed over left-discussed post op precautions and avoiding adduction past midline      Outcome Measure:  Lower Extremity Funtional Score (LEFS): 40      Assessment: Pt is 67 y.o. male s/p L BLANCA performed on 8/29/23 due to chronic pain and disability.  Pt demonstrates decreased BLE flexibility, decreased left hip strength and ROM (following post op restrictions), and functional limitations such as don/doffing socks and shoes.  Signs and symptoms consistent with post op status. Pt is a good candidate for skilled PT intervention to address above mentioned impairments.        Treatment:   1. Initial evaluation   2. Pt ed on diagnosis, prognosis, goals of PT, expectations   3. HEP (see below) ed on normal vs abnormal response  4. Education on scar massage, post op restrictions (sitting, don/doffing shoes, holding on bowling)    Access Code: [email or web address]  URL: [email or web address]  Date: 10/03/2023  Prepared by: Blossom Blankenship    Exercises  - Supine Bridge  - 2 x daily - 7 x weekly - 2 sets - 10 reps - 5 hold  - Active Straight Leg Raise with Quad Set  - 2 x daily - 7 x weekly - 2 sets - 10 reps - 5 hold  - Supine Hamstring Stretch with Strap  - 2 x daily - 7 x weekly - 1 sets - 3 reps - 30 hold  - Clamshell  - 2 x daily - 7 x weekly - 2 sets - 10 reps - 5 hold  - Sidelying Hip Abduction  - 2 x daily - 7 x weekly - 2 sets - 10 reps  - Seated Hamstring Stretch  - 2 x daily - 7 x weekly - 1 sets - 3 reps - 20 hold  - squat    Planned Interventions: Biofeedback, Cryotherapy, Dry Needling, Education/Instruction, Electrical Stimulation, Home Program, Hot Pack, Kinesiotaping, Manual Therapy,  Neuromuscular Re-education, Self Care/Home Management, Therapeutic Exercises, Ultrasound, Mechanical Traction, Aquatic Therapy, Vasopneumatic device with cold    Frequency and Duration:   1x per week for 8-10 weeks    Rehab Potential:  Excellent    Plan of Care Agreement:  Patient    Goals:  Pt will meet the following goals in 10 weeks  Pt will report <0-2/10 pain with ADL's and functional mobility.  Pt will be independent with HEP at least 3 days per week to maintain gains made in therapy.   Pt will increase bilateral hip strength 5/5 to help control squatting, stooping, stairs, lifting, pushing, etc.  Pt will increase left hip flexion AROM > 115 to ease don/doffing socks and shoes  LEFS > 65   Pt will return to bowling 1x per week without pain or limitation

## 2023-10-03 NOTE — Clinical Note
October 3, 2023    Blossom Blankenship, PT  [address] [facility]  Rehabilitation Services, [facility] [address]  [facility] [address]    Patient: Zan eRed   YOB: 1955   Date of Visit: 10/3/2023       Dear No referring provider defined for this encounter.    The attached plan of care is being sent to you because your patient’s medical reimbursement requires that you certify the plan of care. Your signature is required to allow uninterrupted insurance coverage.      You may indicate your approval by signing below and faxing this form back to us at Dept Fax: [phone].    Please call Dept: [phone] with any questions or concerns.    Thank you for this referral,        Blossom Blankenship, PT  PAR [address] [facility]  [address] [facility] [id number] [phone]    Payer: Payor: MEDICARE / Plan: MEDICARE PART A AND B / Product Type: *No Product type* /                                                                         Date:     Dear Blossom Blankenship PT,     Re: Mr. Zan Reed, MRN:[id number]    I certify that I have reviewed the attached plan of care and it is medically necessary for Mr. Zan Reed (1955) who is under my care.          ______________________________________                    _________________  Provider name and credentials                                           Date and time                                                                                           Plan of Care 10/3/23   Effective from: 10/3/2023  Effective to: 12/12/2023    Plan ID: [id number]            Participants as of Finalize on 10/3/2023    Name Type Comments Contact Info    Blossom Blankenship PT Physical Therapist  [phone]    Liliya Mead PA-C Physician Assistant Please sign Medicare POC for continued physical therapy treatment. [phone]       Last Plan Note     Author: Blossom Blankenship PT Status: Incomplete Last edited:  10/3/2023 11:45 AM       Physical Therapy    Physical Therapy Evaluation and Treatment      Patient Name: Zan Reed  MRN: [id number]  Today's Date: 10/3/2023  Time Calculation  Start Time: 1210  Stop Time: 1310  Time Calculation (min): 60 min      Visit #: 1  Approved number of visits: med nec  Auth Required No    Cert dates:10/3/23-12/12/23        Current Problem:   1. Status post total replacement of left hip  Follow Up In Physical Therapy          Subjective        Precautions:  Precautions  Precautions Comment: none       Pain:  Pain Assessment  Pain Assessment: 0-10  Pain Score: 0 - No pain  At worst 2-3 with too much bending over, achy around incision      History:  Chief complaint:   Left BLANCA 8/29/23 Posterior approach. Home health PT ended last week.  Still feels like he has end range of motion exercises like tying his shoe and put on his sock. BUT unsure if he can really progress this due to post op restrictions. Currently not really using cane but keep it in car if needed.      Relevant PMH:  History of low back pain with injections and eventually progressed to hip issues.     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    Follow up with surgeon 10/12/23    Objective:    Flexibility: Major loss bilateral hamstring flexibility, adductors, and quad flexibility (also limited by TKE range)      Strength (MMT):    Hip flexion: R 5, L 4  Hip abduction: R 4+, L 4-  Hip Extension: R 4, L 4-  Hip IR: R 4, L 4-  Hip ER: R 4, L 4-    Knee extension: R WNL, L WNL  Knee flexion: R WNL, L WNL        Range of Motion (degrees of motion):      Hip flexion: R 120, L  90  Hip abduction: R 20, L 10  Hip IR: R WNL, L NT  Hip ER: R WNL, L 20        Palpation/other: Incision healing well without sign of infection.  Long stitch at both ends of incision removed via sterile tweezers today. Education on scar massage.  Gait: pt ambulates without deviation in gait or assistive device  Posture: pt sits with left leg crossed over left-discussed post op precautions and avoiding adduction past midline      Outcome Measure:  Lower Extremity Funtional Score (LEFS): 40      Assessment: Pt is 67 y.o. male s/p L BLANCA performed on 8/29/23 due to chronic pain and disability.  Pt demonstrates decreased BLE flexibility, decreased left hip strength and ROM (following post op restrictions), and functional limitations such as don/doffing socks and shoes.  Signs and symptoms consistent with post op status. Pt is a good candidate for skilled PT intervention to address above mentioned impairments.        Treatment:   1. Initial evaluation   2. Pt ed on diagnosis, prognosis, goals of PT, expectations   3. HEP (see below) ed on normal vs abnormal response  4. Education on scar massage, post op restrictions (sitting, don/doffing shoes, holding on bowling)    Access Code: [email or web address]  URL: [email or web address]  Date: 10/03/2023  Prepared by: Blossom Blankenship    Exercises  - Supine Bridge  - 2 x daily - 7 x weekly - 2 sets - 10 reps - 5 hold  - Active Straight Leg Raise with Quad Set  - 2 x daily - 7 x weekly - 2 sets - 10 reps - 5 hold  - Supine Hamstring Stretch with Strap  - 2 x daily - 7 x weekly - 1 sets - 3 reps - 30 hold  - Clamshell  - 2 x daily - 7 x weekly - 2 sets - 10 reps - 5 hold  - Sidelying Hip Abduction  - 2 x daily - 7 x weekly - 2 sets - 10 reps  - Seated Hamstring Stretch  - 2 x daily - 7 x weekly - 1 sets - 3 reps - 20 hold  - squat    Planned Interventions: Biofeedback, Cryotherapy, Dry Needling, Education/Instruction, Electrical Stimulation, Home Program, Hot Pack,  Kinesiotaping, Manual Therapy, Neuromuscular Re-education, Self Care/Home Management, Therapeutic Exercises, Ultrasound, Mechanical Traction, Aquatic Therapy, Vasopneumatic device with cold    Frequency and Duration:   1x per week for 8-10 weeks    Rehab Potential:  Excellent    Plan of Care Agreement:  Patient    Goals:  Pt will meet the following goals in 10 weeks  Pt will report <0-2/10 pain with ADL's and functional mobility.  Pt will be independent with HEP at least 3 days per week to maintain gains made in therapy.   Pt will increase bilateral hip strength 5/5 to help control squatting, stooping, stairs, lifting, pushing, etc.  Pt will increase left hip flexion AROM > 115 to ease don/doffing socks and shoes  LEFS > 65   Pt will return to bowling 1x per week without pain or limitation                         Current Participants as of 10/3/2023    Name Type Comments Contact Info    Blossom Blankenship, PT Physical Therapist  [phone]    Signature pending    Liliya Mead PA-C Physician Assistant Please sign Medicare POC for continued physical therapy treatment. [phone]    Signature pending

## 2023-10-03 NOTE — Clinical Note
October 3, 2023     Patient: Zan Reed   YOB: 1955   Date of Visit: 10/3/2023       To Whom it May Concern:    Zan Reed was seen in my clinic on 10/3/2023. He {Return to school/sport:92098}.    If you have any questions or concerns, please don't hesitate to call.         Sincerely,          Blossom Blankenship, PT        CC: No Recipients

## 2023-10-03 NOTE — Clinical Note
October 3, 2023     Patient: Zan Reed   YOB: 1955   Date of Visit: 10/3/2023       To Whom It May Concern:    It is my medical opinion that Zan Reed {Work release (duty restriction):98544}.    If you have any questions or concerns, please don't hesitate to call.         Sincerely,        Blossom Blankenship, PT    CC: No Recipients

## 2023-10-12 ENCOUNTER — OFFICE VISIT (OUTPATIENT)
Dept: ORTHOPEDIC SURGERY | Facility: CLINIC | Age: 68
End: 2023-10-12
Payer: MEDICARE

## 2023-10-12 ENCOUNTER — ANCILLARY PROCEDURE (OUTPATIENT)
Dept: RADIOLOGY | Facility: CLINIC | Age: 68
End: 2023-10-12
Payer: MEDICARE

## 2023-10-12 DIAGNOSIS — Z47.1 AFTERCARE FOLLOWING LEFT HIP JOINT REPLACEMENT SURGERY: ICD-10-CM

## 2023-10-12 DIAGNOSIS — Z96.642 AFTERCARE FOLLOWING LEFT HIP JOINT REPLACEMENT SURGERY: ICD-10-CM

## 2023-10-12 PROCEDURE — 73502 X-RAY EXAM HIP UNI 2-3 VIEWS: CPT | Mod: LT,FY

## 2023-10-12 PROCEDURE — 1125F AMNT PAIN NOTED PAIN PRSNT: CPT | Performed by: PHYSICIAN ASSISTANT

## 2023-10-12 PROCEDURE — 99024 POSTOP FOLLOW-UP VISIT: CPT | Performed by: PHYSICIAN ASSISTANT

## 2023-10-12 PROCEDURE — 73502 X-RAY EXAM HIP UNI 2-3 VIEWS: CPT | Mod: LEFT SIDE | Performed by: RADIOLOGY

## 2023-10-12 PROCEDURE — 1159F MED LIST DOCD IN RCRD: CPT | Performed by: PHYSICIAN ASSISTANT

## 2023-10-12 PROCEDURE — 1036F TOBACCO NON-USER: CPT | Performed by: PHYSICIAN ASSISTANT

## 2023-10-12 ASSESSMENT — PAIN DESCRIPTION - DESCRIPTORS: DESCRIPTORS: ACHING;TIGHTNESS

## 2023-10-12 ASSESSMENT — PAIN - FUNCTIONAL ASSESSMENT: PAIN_FUNCTIONAL_ASSESSMENT: 0-10

## 2023-10-12 ASSESSMENT — PAIN SCALES - GENERAL: PAINLEVEL_OUTOF10: 2

## 2023-10-12 NOTE — PROGRESS NOTES
CC    HPI:  The patient is here for follow-up of their side: left total hip arthroplasty.  The patient has mild hip pain.  The patient has no mechanical symptoms.  The patient has mild swelling.  The patient is approximately 6 week(s) postop. The patients wound has healed uneventfully.  The patient has been doing HEP and outpatient PT.  No complications postoperatively.      Past Medical History:   Diagnosis Date    Acute myocardial infarction, unspecified (CMS/[facility]) 02/18/2015    AMI (acute myocardial infarction)    Other conditions influencing health status 04/23/2017    History of cough    Personal history of other specified conditions 05/08/2022    History of elevated prostate specific antigen (PSA)    Sacrococcygeal disorders, not elsewhere classified 05/12/2015    Sacro-iliac pain     Medication Documentation Review Audit       Reviewed by Vi Lazaro CMA (Medical Assistant) on 10/12/23 at 1514      Medication Order Taking? Sig Documenting Provider Last Dose Status   acetaminophen (Tylenol) 500 mg tablet [id number] Yes TAKE 1 TABLET BY MOUTH EVERY 6 HOURS Liliya Mead PA-C Taking Active   albuterol 90 mcg/actuation inhaler [id number] Yes Inhale if needed. Joaquin Springer MD Taking Active   aspirin (Adult Low Dose Aspirin) 81 mg EC tablet [id number] No Take 1 tablet (81 mg) by mouth once daily. Joaquin Provider, MD Not Taking Active   aspirin 81 mg EC tablet [id number] Yes TAKE 1 TABLET BY MOUTH TWO TIMES A DAY Liliya Mead PA-C Taking Active   atorvastatin (Lipitor) 80 mg tablet [id number] Yes Take 1 tablet (80 mg) by mouth once daily. Raj Romero MD Taking Active   calcium carbonate-vitamin D3 500 mg-5 mcg (200 unit) tablet [id number] Yes Take 1 tablet by mouth once daily. Historical Provider, MD Taking Active   chlorhexidine (Peridex) 0.12 % solution [id number]  Use 15 mL in the mouth or throat once daily. Historical Provider, MD  Active   cholecalciferol (Vitamin D3) 25 MCG (1000 UT)  capsule [id number] Yes Take by mouth. Historical Provider, MD Taking Active   clopidogrel (Plavix) 75 mg tablet [id number] Yes Take 1 tablet (75 mg) by mouth once daily. Historical Provider, MD Taking Active   dextromethorphan-guaifenesin (Mucinex DM)  mg 12 hr tablet [id number]  Take 1-2 tablets by mouth every 12 hours if needed. Do not crush, chew, or split. Historical Provider, MD  Active   docusate sodium (Colace) 100 mg capsule [id number]  TAKE 1 CAPSULE BY MOUTH TWO TIMES A DAY Liliya Mead PA-C  Active   doxycycline (Vibra-Tabs) 100 mg tablet [id number] No TAKE 1 TABLET 1 HOUR BEFORE APPOINTMENT ,THEN TAKE 1 TABLET ONCE A DAY FOR 2 DAYS ATER PROCEDURE Historical Provider, MD Not Taking Active   doxycycline (Vibramycin) 50 mg capsule [id number] Yes  Historical Provider, MD Taking Active   fluticasone propion-salmeteroL (Advair Diskus) 100-50 mcg/dose diskus inhaler [id number]  Inhale 1 puff  in the morning and 1 puff in the evening. Raj Romero MD  Active   gabapentin (Neurontin) 300 mg capsule [id number] Yes Take 1 capsule (300 mg) by mouth once daily at bedtime. Historical Provider, MD Taking Active   levothyroxine (LevoxyL) 100 mcg tablet [id number] Yes Take by mouth. Historical Provider, MD Taking Active   levothyroxine (Synthroid, Levoxyl) 150 mcg tablet [id number] Yes Take 1 tablet (150 mcg) by mouth once daily. Historical Provider, MD Taking Active   meloxicam (Mobic) 15 mg tablet [id number]  Take 1 tablet (15 mg) by mouth once daily as needed. Historical Provider, MD  Active   metoprolol tartrate (Lopressor) 25 mg tablet [id number] Yes Take 1 tablet (25 mg) by mouth 2 times a day. Historical Provider, MD Taking Active   metroNIDAZOLE (Metrolotion) 0.75 % lotion lotion [id number] Yes  Historical Provider, MD Taking Active   multivitamin capsule [id number] Yes 1 tablet orally daily Historical Provider, MD Taking Active   nitroglycerin (Nitrostat) 0.4 mg SL tablet [id number] Yes Place under the tongue. Historical  Provider, MD Taking Active   omega 3-dha-epa-fish oil (Fish OiL) 1,000 mg (120 mg-180 mg) capsule [id number] Yes Take by mouth. Historical Provider, MD Taking Active   oxyCODONE (Roxicodone) 5 mg immediate release tablet [id number]  TAKE 1 TABLET BY MOUTH EVERY 6 HOURS AS NEEDED FOR PAIN Liliya Mead PA-C  Active   pantoprazole (ProtoNix) 40 mg EC tablet [id number]  TAKE 1 TABLET BY MOUTH ONCE DAILY Liliya Mead PA-C  Active   sildenafil (Viagra) 100 mg tablet [id number] Yes take 1 tablet by mouth once daily 1 hour BEFORE NEEDED Historical Provider, MD Taking Active   sulfamethoxazole-trimethoprim (Bactrim DS) 800-160 mg tablet [id number]  Take 1 tablet by mouth 2 times a day. Historical Provider, MD  Active   tadalafil (Cialis) 5 mg tablet [id number] Yes Take 1 tablet (5 mg) by mouth once daily. Historical Provider, MD Taking Active   tadalafil 20 mg tablet [id number]  Take 1 tablet (20 mg) by mouth if needed for erectile dysfunction. 1 hour before activity Historical Provider, MD  Active   traMADol (Ultram) 50 mg tablet [id number]  take 1 tablet by mouth every 6 to 8 hours if needed for pain Historical Provider, MD  Active   traMADol (Ultram) 50 mg tablet [id number]  TAKE 1 TABLET BY MOUTH EVERY 6 HOURS AS NEEDED FOR PAIN Liliya Mead PA-C  Active                  Allergies   Allergen Reactions    Cat Dander Other     Sneezing, congestion    Cat's Claw Other    Clindamycin Rash    Penicillins Rash and Other     Hasn't happened since he was a little child, thinks rash and swelling were the reactions     Social History     Socioeconomic History    Marital status:      Spouse name: Not on file    Number of children: Not on file    Years of education: Not on file    Highest education level: Not on file   Occupational History    Not on file   Tobacco Use    Smoking status: Never    Smokeless tobacco: Never   Substance and Sexual Activity    Alcohol use: Yes     Alcohol/week: 3.0 standard drinks of alcohol     Types:  3 Cans of beer per week     Comment: per week    Drug use: Never    Sexual activity: Not on file   Other Topics Concern    Not on file   Social History Narrative    Not on file     Social Determinants of Health     Financial Resource Strain: Not on file   Food Insecurity: Not on file   Transportation Needs: Not on file   Physical Activity: Not on file   Stress: Not on file   Social Connections: Not on file   Intimate Partner Violence: Not on file   Housing Stability: Not on file     Past Surgical History:   Procedure Laterality Date    CORONARY ANGIOPLASTY WITH STENT PLACEMENT  05/17/2016    Cath Stent Placement    OTHER SURGICAL HISTORY  05/02/2014    Secondary Repair Of Ruptured Achilles Tendon    OTHER SURGICAL HISTORY  05/02/2014    Tenodesis Of Biceps Tendon At Elbow    US GUIDED ASPIRATION OF ABSCESS, HEMATOMA, CYST  8/14/2015    US GUIDED ASPIRATION OF ABSCESS, HEMATOMA, CYST 8/14/2015 [facility]       Physical Examination  Examination of the side: lefthip  There were no vitals filed for this visit.  AxO x 3 in NAD, appears stated age. Cooperative.   Assistive Device: no device. Coordination and balance intact.  Skin inact over bilateral upper and lower extremities, no erythema, ecchymosis, temperature changes. No popliteal lymphadenopathy,  No other overlying lesion  mood: euthymic  Respirations non labored  Surgical hip demonstrates pain free ROM with mild tenderness to palpation over greater trochanter laterally.  The incision is healing well with no signs of surrounding infection, dehiscence or drainage.   Neurovascularly back to baseline  5/5 hip flexion/knee extension/DF/PF/EHL  SILT in savanna/saph/ per/tib distribution   Extremities warm and well perfused.  No lower extremity calf tenderness or swelling  2+ Femoral/DP/PT pulses bilaterally  negative Stinchfield test.  There is a negative straight leg raise.    Radiographs:   No images are attached to the encounter.    I personally reviewed  multiple views of the knee today in clinics. Status post side: left Total Hip Arthroplasty. The implant is well fixed, well aligned.  No evidence of davy-implant fracture, lucency or dislocation.    Impression/Plan:        S/P Total side: left Hip  Arthroplasty  I talked with patient at length about activity precautions and dislocation precautions in detail. I talked with patient at length about activity precautions and dislocation precautions in detail which include avoidance of hip flexion > 90 degrees with simultaneous internal rotation.  Patient can progress activities as tolerated. And understands their permanent precautions. At this time, you may gradually increase your activities and get back to a normal, low-impact lifestyle. Please avoid running, jumping, and heavy lifting.    This was  demonstrated in the room by OFELIA and patient verbalized understanding. Patient should also limit running, jumping, weight lifting and repetitive activity. The patient verbalizes understanding of these permanent precautions.        2. Continue HEP or outpatient PT, per protocol.    3. Continue Post-operative instructions.    4. Discussed the importance of dental prophylactic dental antibiotics lifelong. Patient may request medication refill through MyChart,       Pharmacy or surgeons office.    All questions were answered.    Follow-up 1 year with xrays at next visit.        This note was created using voice recognition software and was not corrected for typographical or grammatical errors.    SHIRA Silveira, PA-C ATC  Orthopedic Physician Assisant  Adult Reconstruction and Total Joint Replacement  General Orthopedics  Department of Orthopaedic Surgery  [place] [address]  [employer] Network[employer] preferred

## 2023-10-16 DIAGNOSIS — Z96.642 S/P TOTAL LEFT HIP ARTHROPLASTY: Primary | ICD-10-CM

## 2023-10-16 RX ORDER — ACETAMINOPHEN 500 MG
1000 TABLET ORAL EVERY 6 HOURS PRN
Qty: 30 TABLET | Refills: 0 | Status: SHIPPED | OUTPATIENT
Start: 2023-10-16 | End: 2023-10-26

## 2023-10-19 ENCOUNTER — DOCUMENTATION (OUTPATIENT)
Dept: PHYSICAL THERAPY | Facility: CLINIC | Age: 68
End: 2023-10-19
Payer: MEDICARE

## 2023-10-19 ENCOUNTER — APPOINTMENT (OUTPATIENT)
Dept: PHYSICAL THERAPY | Facility: CLINIC | Age: 68
End: 2023-10-19
Payer: MEDICARE

## 2023-10-19 NOTE — PROGRESS NOTES
Physical Therapy                 Therapy Communication Note    Patient Name: Zan Reed  MRN: [id number]  Today's Date: 10/19/2023     Discipline: Physical Therapy    Missed Visit Reason:  wife had sx    Missed Time: Cancel

## 2023-10-21 DIAGNOSIS — I25.10 CORONARY ARTERY DISEASE INVOLVING NATIVE HEART WITHOUT ANGINA PECTORIS, UNSPECIFIED VESSEL OR LESION TYPE: Primary | ICD-10-CM

## 2023-10-26 ENCOUNTER — TREATMENT (OUTPATIENT)
Dept: PHYSICAL THERAPY | Facility: CLINIC | Age: 68
End: 2023-10-26
Payer: MEDICARE

## 2023-10-26 DIAGNOSIS — Z96.642 STATUS POST TOTAL REPLACEMENT OF LEFT HIP: ICD-10-CM

## 2023-10-26 PROCEDURE — 97110 THERAPEUTIC EXERCISES: CPT | Mod: GP | Performed by: PHYSICAL THERAPIST

## 2023-10-26 NOTE — PROGRESS NOTES
"Physical Therapy    Physical Therapy Treatment    Patient Name: Zan Reed  MRN: [id number]  Today's Date: 10/26/2023  Time Calculation  Start Time: 1153  Stop Time: 1235  Time Calculation (min): 42 min    Visit #: 2  Approved number of visits: med ashu  Amara Required No  Cert dates:10/3/23-12/12/23    Assessment:   Pt challenged this session with noted fatigue in glutes throughout session. Pt demonstrates dynamic B knee valgus during squat taps on mat, but improved with verbal and tactile cueing from theraband. HEP updated. Pt is appropriate for continued skilled PT services to address remaining impairments.     Plan:   Continue with hip strengthening, progress as tolerated.    Current Problem  1. Status post total replacement of left hip  Follow Up In Physical Therapy          Subjective   General   Pt states that his L hip has been good with no c/o increased pain and noted improvements in ROM as he is able to don socks/shoes much easier. Saw surgeon and states that everything looked good. Pt was advised to avoid running and heavy lifting with LUE. Pt states he does his exercises, but not as compliant as he would like as he has been caring for wife post surgery.  Precautions   Running, heavy lifting on L side, avoid simultaneous > 90 deg flex and IR   Pain   0/10    Objective     Treatments:  Therapeutic Exercise:  NuStep L5 x 8\"   Standing 3 way hip red TB at ankles with UE support x 10 R/L   Side stepping red TB at ankles x 15' x 3 R/L  Monster walks red TB at ankles x 15' x 3 fwd/bwd  8\" step up with LLE and down with RLE x 10  Lateral lunges on BOSU x 10 R/L   3 way hip on air ex at bar x 10 each R/L  Bridges 5\" x 10   Bridges with SB 5\" x 10   Clamshells 5\" x 10   S/L hip ABD 5\" x 10   Squat taps on low mat x 5, with green TB x 10    Education:  Pt educated on updated HEP.  "

## 2023-10-31 RX ORDER — CLOPIDOGREL BISULFATE 75 MG/1
75 TABLET ORAL DAILY
Qty: 90 TABLET | Refills: 3 | Status: SHIPPED | OUTPATIENT
Start: 2023-10-31 | End: 2024-02-16 | Stop reason: SDUPTHER

## 2023-11-02 ENCOUNTER — TREATMENT (OUTPATIENT)
Dept: PHYSICAL THERAPY | Facility: CLINIC | Age: 68
End: 2023-11-02
Payer: MEDICARE

## 2023-11-02 DIAGNOSIS — Z96.642 STATUS POST TOTAL REPLACEMENT OF LEFT HIP: Primary | ICD-10-CM

## 2023-11-02 PROCEDURE — 97110 THERAPEUTIC EXERCISES: CPT | Mod: GP | Performed by: PHYSICAL THERAPIST

## 2023-11-02 NOTE — PROGRESS NOTES
"Physical Therapy    Physical Therapy Treatment    Patient Name: Zan Reed  MRN: [id number]  Today's Date: 11/2/2023  Time Calculation  Start Time: 0925 (pt arrived late to appt)  Stop Time: 1003  Time Calculation (min): 38 min    Visit #: 3  Approved number of visits: med nec  Auth Required No  Cert dates:10/3/23-12/12/23    Assessment:   Pt challenged this session with fatigue noted during side stepping and SLS demonstrating continued weakness in B glute meds L > R. Pt required cueing for TA activation during SB bridges d/t oblique doming and lack of stability. HEP updated to improve hip flexion ROM for tying shoes. Pt is appropriate for continued skilled PT services to address remaining impairments.    Plan:   Continue with hip strengthening and stretching to assist with tying shoes, progress as tolerated.    Current Problem  1. Status post total replacement of left hip  Follow Up In Physical Therapy          Subjective   General   Pt states that he felt good after last session and likes the new exercises. Biggest complaint now is that tying shoes are a little hard because he needs a little more ROM to reach. Compliant with HEP.    Precautions   Running, heavy lifting on L side, avoid simultaneous > 90 deg flex and IR   Pain   0/10    Objective     Treatments:  Therapeutic Exercise:  NuStep L4 x 6\"   Seated figure 4 stretch 30\" x 3 R/L*  Standing 3 way hip red TB at ankles on air ex with UE support x 10 x 2 R/L   Side stepping red TB at ankles x 15' x 2 R/L  Monster walks red TB at ankles x 15' x 2 fwd/bwd  Step ups on blue side of BOSU x 10 R/L, on black side of BOSU x 10 R/L  DKTC on SB 5\" x 20*  Bridges with SB 5\" x 10 x 2   S/L hip ABD 5\" x 10 R/L   S/L hip ext in ABD x 10 R/L  Prone hip ext 5\" x 10 R/L  *added to HEP  "

## 2023-11-03 ENCOUNTER — APPOINTMENT (OUTPATIENT)
Dept: UROLOGY | Facility: HOSPITAL | Age: 68
End: 2023-11-03
Payer: COMMERCIAL

## 2023-11-07 ENCOUNTER — TREATMENT (OUTPATIENT)
Dept: PHYSICAL THERAPY | Facility: CLINIC | Age: 68
End: 2023-11-07
Payer: MEDICARE

## 2023-11-07 DIAGNOSIS — Z96.642 STATUS POST TOTAL REPLACEMENT OF LEFT HIP: Primary | ICD-10-CM

## 2023-11-07 PROCEDURE — 97110 THERAPEUTIC EXERCISES: CPT | Mod: GP | Performed by: PHYSICAL THERAPIST

## 2023-11-07 NOTE — PROGRESS NOTES
"Physical Therapy    Physical Therapy Treatment    Patient Name: Zan Reed  MRN: [id number]  Today's Date: 11/7/2023  Time Calculation  Start Time: 1118  Stop Time: 1205  Time Calculation (min): 47 min    Visit #: 3  Approved number of visits: med ashu  Amara Required No  Cert dates:10/3/23-12/12/23    Assessment:   Pt progressing very well post operatively but challenged by dynamic and static balance exercises today. Cues to engage abdominal muscles helps with stabilization.  Pt was curious about comparison of hip ROM right vs left so measured that today.  Mostly limited in right hip ER compared to left and reviewed the hip ER stretch that was added last visit.  Pt is a good candidate for continued PT to help return to all functional tasks without difficulty.     Plan:   Continue with hip strengthening and stretching to assist with function at home, balance and stabilization work as well    Current Problem  1. Status post total replacement of left hip  Follow Up In Physical Therapy    Follow Up In Physical Therapy          Subjective   General   Pt states he is feeling pretty good and tying his shoes has improved.  Still feels like he doesn't have full ROM on left side but it hasn't really stopped him from doing things he needs to do.     Precautions   Running, heavy lifting on L side, avoid simultaneous > 90 deg flex and IR   Pain   0/10    Objective     AROM  Hip flexion right 110, left 100  Hip ER (prone)  right 40, left 20  Hip Abduction right 28, left 23    Treatments:  Therapeutic Exercise:  NuStep L6 x 6\"   HC stretch 30\"x3  Dynamic: marching, butt kick, open/close gate  Side stepping red TB at ankles x 40' x 2 R/L  Monster walks green TB at ankles x 40' x 2 fwd/bwd  Standing 3 way hip red TB at ankles on air ex with UE support x 10 x 2 R/L   Step ups on blue side of BOSU x 10 R/L  Bosu squats on flat side 15x  Tandem airex 30\"x R/L  SLS with level pelvis focus 60\" R/L  Hip hike 4 inch step to fatigue " "left   Left hip flexor stretch 60\" supine-added to HEP  Seated figure 4 stretch 30\" x 3 R/L    "

## 2023-11-20 ENCOUNTER — TREATMENT (OUTPATIENT)
Dept: PHYSICAL THERAPY | Facility: CLINIC | Age: 68
End: 2023-11-20
Payer: MEDICARE

## 2023-11-20 DIAGNOSIS — Z96.642 STATUS POST TOTAL REPLACEMENT OF LEFT HIP: ICD-10-CM

## 2023-11-20 PROCEDURE — 97110 THERAPEUTIC EXERCISES: CPT | Mod: GP,CQ

## 2023-11-20 NOTE — PROGRESS NOTES
"Physical Therapy    Physical Therapy Treatment    Patient Name: Zan Reed  MRN: [id number]  Today's Date: 11/20/2023    Time Calculation  Start Time: 1215  Stop Time: 1300  Time Calculation (min): 45 min    Visit #: 5  Approved number of visits: med ashu  Auth Required No  Cert dates:10/3/23-12/12/23    Assessment:   Patient presented to session without pain in the L hip. Demonstrates challenges with dynamic and static balance exercises on stable and compliant surfaces. Moderate fatigue of hip abductors during resisted lateral ambulation. Added Air Ex to SLS using finger touch to challenge proprioception. Reviewed gentle seated figure four stretch with proper technique. Updated HEP.    Plan:   Continue with hip strengthening and stretching to assist with function at home, balance and stabilization work as well    Current Problem  1. Status post total replacement of left hip  Follow Up In Physical Therapy          Subjective   General   Pt states he is trying to do his HEP up to 2x a day; working out at his workplace and getting more cardio in.    Precautions   Running, heavy lifting on L side, avoid simultaneous > 90 deg flex and IR     Pain   0/10    Objective     Treatments:  Therapeutic Exercise:  NuStep L6 x 6' LEs only  HC stretch 30\"x3  Dynamic: marching, butt kick, open/close gate x 2 laps each  Side stepping red TB at ankles x 40' x 2 R/L  Monster walks green TB at ankles x 40' x 2 fwd/bwd  Standing 3 way hip red TB at ankles on air ex with UE support x 10 x 2 R/L   Step ups on blue side of BOSU 2 x 10 R/L  Bosu squats on flat side 15x  Tandem airex 30\"x R/L  SLS with level pelvis focus on Air Ex 30\" R/L finger touch  Hip hike 4 inch step 2 x 10 R/L (added to HEP)  Left hip flexor stretch 60\" supine  Seated figure 4 stretch 30\" x 3 R/L  "

## 2023-11-28 ENCOUNTER — APPOINTMENT (OUTPATIENT)
Dept: PHYSICAL THERAPY | Facility: CLINIC | Age: 68
End: 2023-11-28
Payer: MEDICARE

## 2023-11-30 ENCOUNTER — PROCEDURE VISIT (OUTPATIENT)
Dept: UROLOGY | Facility: HOSPITAL | Age: 68
End: 2023-11-30
Payer: MEDICARE

## 2023-11-30 ENCOUNTER — APPOINTMENT (OUTPATIENT)
Dept: PHYSICAL THERAPY | Facility: CLINIC | Age: 68
End: 2023-11-30
Payer: MEDICARE

## 2023-11-30 VITALS — SYSTOLIC BLOOD PRESSURE: 132 MMHG | HEART RATE: 65 BPM | DIASTOLIC BLOOD PRESSURE: 74 MMHG

## 2023-11-30 DIAGNOSIS — N40.1 BPH WITH OBSTRUCTION/LOWER URINARY TRACT SYMPTOMS: Primary | ICD-10-CM

## 2023-11-30 DIAGNOSIS — N13.8 BPH WITH OBSTRUCTION/LOWER URINARY TRACT SYMPTOMS: Primary | ICD-10-CM

## 2023-11-30 PROCEDURE — 52000 CYSTOURETHROSCOPY: CPT | Performed by: UROLOGY

## 2023-11-30 NOTE — PROGRESS NOTES
HPI  68 year old male with BPH, incomplete emptying, referred by Dr. John for possible HoLEP.     Seen 6/23/23, PVR 246cc. Also with ED, ePSA with +Fhx PCa in his dad. Started on daily tadalafil. Comes in today for follow up, PVR 69cc after double voiding. Reports weak stream, some urgency and frequency, but mostly bothersome obstructive symptoms. Inclined to have his prostate surgically addressed. No UTIs, complete retention, hematuria. He is getting a hip replacement in 6 weeks. No cross-sectional imaging to determine prostate volume.     PSA history:   6/2023 - 3.87  7/2022 - 3.9 (18% free)  5/2022 - 5.61         11/30/23 - seen today for cystoscopy to evaluate his channel and to discuss surgery. Symptoms are fairly mild. Took 1 pill abx this AM given recent hip replacement.    Lab Results   Component Value Date    PSA 3.85 06/17/2023    PSA 3.9 07/29/2022    PSA 5.61 (H) 05/07/2022    PSA 2.44 08/14/2020       Current Medications:  Current Outpatient Medications   Medication Sig Dispense Refill    acetaminophen (Tylenol) 500 mg tablet TAKE 1 TABLET BY MOUTH EVERY 6 HOURS 120 tablet 0    albuterol 90 mcg/actuation inhaler Inhale if needed.      aspirin (Adult Low Dose Aspirin) 81 mg EC tablet Take 1 tablet (81 mg) by mouth once daily.      aspirin 81 mg EC tablet TAKE 1 TABLET BY MOUTH TWO TIMES A DAY 60 tablet 0    atorvastatin (Lipitor) 80 mg tablet Take 1 tablet (80 mg) by mouth once daily. 90 tablet 3    calcium carbonate-vitamin D3 500 mg-5 mcg (200 unit) tablet Take 1 tablet by mouth once daily.      chlorhexidine (Peridex) 0.12 % solution Use 15 mL in the mouth or throat once daily.      cholecalciferol (Vitamin D3) 25 MCG (1000 UT) capsule Take by mouth.      clopidogrel (Plavix) 75 mg tablet take 1 tablet by mouth once daily 90 tablet 3    dextromethorphan-guaifenesin (Mucinex DM)  mg 12 hr tablet Take 1-2 tablets by mouth every 12 hours if needed. Do not crush, chew, or split.      docusate  sodium (Colace) 100 mg capsule TAKE 1 CAPSULE BY MOUTH TWO TIMES A DAY 60 capsule 0    doxycycline (Vibra-Tabs) 100 mg tablet TAKE 1 TABLET 1 HOUR BEFORE APPOINTMENT ,THEN TAKE 1 TABLET ONCE A DAY FOR 2 DAYS ATER PROCEDURE      doxycycline (Vibramycin) 50 mg capsule       fluticasone propion-salmeteroL (Advair Diskus) 100-50 mcg/dose diskus inhaler Inhale 1 puff  in the morning and 1 puff in the evening. 60 each 3    gabapentin (Neurontin) 300 mg capsule Take 1 capsule (300 mg) by mouth once daily at bedtime.      levothyroxine (LevoxyL) 100 mcg tablet Take by mouth.      levothyroxine (Synthroid, Levoxyl) 150 mcg tablet Take 1 tablet (150 mcg) by mouth once daily.      meloxicam (Mobic) 15 mg tablet Take 1 tablet (15 mg) by mouth once daily as needed.      metoprolol tartrate (Lopressor) 25 mg tablet Take 1 tablet (25 mg) by mouth 2 times a day.      metroNIDAZOLE (Metrolotion) 0.75 % lotion lotion       multivitamin capsule 1 tablet orally daily      nitroglycerin (Nitrostat) 0.4 mg SL tablet Place under the tongue.      omega 3-dha-epa-fish oil (Fish OiL) 1,000 mg (120 mg-180 mg) capsule Take by mouth.      oxyCODONE (Roxicodone) 5 mg immediate release tablet TAKE 1 TABLET BY MOUTH EVERY 6 HOURS AS NEEDED FOR PAIN 24 tablet 0    pantoprazole (ProtoNix) 40 mg EC tablet TAKE 1 TABLET BY MOUTH ONCE DAILY 30 tablet 0    sildenafil (Viagra) 100 mg tablet take 1 tablet by mouth once daily 1 hour BEFORE NEEDED      sulfamethoxazole-trimethoprim (Bactrim DS) 800-160 mg tablet Take 1 tablet by mouth 2 times a day.      tadalafil (Cialis) 5 mg tablet Take 1 tablet (5 mg) by mouth once daily.      tadalafil 20 mg tablet Take 1 tablet (20 mg) by mouth if needed for erectile dysfunction. 1 hour before activity      traMADol (Ultram) 50 mg tablet take 1 tablet by mouth every 6 to 8 hours if needed for pain      traMADol (Ultram) 50 mg tablet TAKE 1 TABLET BY MOUTH EVERY 6 HOURS AS NEEDED FOR PAIN 28 tablet 2     No current  facility-administered medications for this visit.        Active Problems:  Zan Reed is a 68 y.o. male with the following Problems and Medications.  Patient Active Problem List   Diagnosis    Abnormality of femur    Acquired spondylolisthesis    Allergic reaction    Arthritis of hand, left, degenerative    Asthma    Baker cyst    Cellulitis of right upper extremity    Olecranon bursitis of right elbow    Chest pain    Chronic pain    Contusion of left forearm    Coronary artery disease    Cough    ED (erectile dysfunction)    Elevated PSA    Eye redness    Fall from ground level    Fatigue    Frequent urination    Glycosuria    H/O joint replacement    Hyperglycemia    Hyperlipidemia    Hypothyroidism    Insomnia    Left lumbar radiculopathy    Lumbar spinal stenosis    Mechanical low back pain    Osteoarthritis of both knees    Osteoarthrosis of knee    Prostatitis    Pain in joint, lower leg    Right ankle pain    Pain and swelling of left wrist    Right elbow pain    Sacro-iliac pain    Sprain of anterior talofibular ligament of right ankle    Vitamin D deficiency    Hypothyroidism due to Hashimoto's thyroiditis    Mild intermittent asthma without complication    Coronary artery disease involving native heart without angina pectoris    Chronic fatigue    Dermatosis    Arthritis of left hip    Blood glucose elevated    BPH with obstruction/lower urinary tract symptoms    Elevated fasting blood sugar    Incomplete emptying of bladder    Low back pain    Other low back pain    Overweight with body mass index (BMI) of 27 to 27.9 in adult    Traumatic hematoma of forearm, left, initial encounter    Status post total replacement of left hip     Current Outpatient Medications   Medication Sig Dispense Refill    acetaminophen (Tylenol) 500 mg tablet TAKE 1 TABLET BY MOUTH EVERY 6 HOURS 120 tablet 0    albuterol 90 mcg/actuation inhaler Inhale if needed.      aspirin (Adult Low Dose Aspirin) 81 mg EC tablet Take  1 tablet (81 mg) by mouth once daily.      aspirin 81 mg EC tablet TAKE 1 TABLET BY MOUTH TWO TIMES A DAY 60 tablet 0    atorvastatin (Lipitor) 80 mg tablet Take 1 tablet (80 mg) by mouth once daily. 90 tablet 3    calcium carbonate-vitamin D3 500 mg-5 mcg (200 unit) tablet Take 1 tablet by mouth once daily.      chlorhexidine (Peridex) 0.12 % solution Use 15 mL in the mouth or throat once daily.      cholecalciferol (Vitamin D3) 25 MCG (1000 UT) capsule Take by mouth.      clopidogrel (Plavix) 75 mg tablet take 1 tablet by mouth once daily 90 tablet 3    dextromethorphan-guaifenesin (Mucinex DM)  mg 12 hr tablet Take 1-2 tablets by mouth every 12 hours if needed. Do not crush, chew, or split.      docusate sodium (Colace) 100 mg capsule TAKE 1 CAPSULE BY MOUTH TWO TIMES A DAY 60 capsule 0    doxycycline (Vibra-Tabs) 100 mg tablet TAKE 1 TABLET 1 HOUR BEFORE APPOINTMENT ,THEN TAKE 1 TABLET ONCE A DAY FOR 2 DAYS ATER PROCEDURE      doxycycline (Vibramycin) 50 mg capsule       fluticasone propion-salmeteroL (Advair Diskus) 100-50 mcg/dose diskus inhaler Inhale 1 puff  in the morning and 1 puff in the evening. 60 each 3    gabapentin (Neurontin) 300 mg capsule Take 1 capsule (300 mg) by mouth once daily at bedtime.      levothyroxine (LevoxyL) 100 mcg tablet Take by mouth.      levothyroxine (Synthroid, Levoxyl) 150 mcg tablet Take 1 tablet (150 mcg) by mouth once daily.      meloxicam (Mobic) 15 mg tablet Take 1 tablet (15 mg) by mouth once daily as needed.      metoprolol tartrate (Lopressor) 25 mg tablet Take 1 tablet (25 mg) by mouth 2 times a day.      metroNIDAZOLE (Metrolotion) 0.75 % lotion lotion       multivitamin capsule 1 tablet orally daily      nitroglycerin (Nitrostat) 0.4 mg SL tablet Place under the tongue.      omega 3-dha-epa-fish oil (Fish OiL) 1,000 mg (120 mg-180 mg) capsule Take by mouth.      oxyCODONE (Roxicodone) 5 mg immediate release tablet TAKE 1 TABLET BY MOUTH EVERY 6 HOURS AS  NEEDED FOR PAIN 24 tablet 0    pantoprazole (ProtoNix) 40 mg EC tablet TAKE 1 TABLET BY MOUTH ONCE DAILY 30 tablet 0    sildenafil (Viagra) 100 mg tablet take 1 tablet by mouth once daily 1 hour BEFORE NEEDED      sulfamethoxazole-trimethoprim (Bactrim DS) 800-160 mg tablet Take 1 tablet by mouth 2 times a day.      tadalafil (Cialis) 5 mg tablet Take 1 tablet (5 mg) by mouth once daily.      tadalafil 20 mg tablet Take 1 tablet (20 mg) by mouth if needed for erectile dysfunction. 1 hour before activity      traMADol (Ultram) 50 mg tablet take 1 tablet by mouth every 6 to 8 hours if needed for pain      traMADol (Ultram) 50 mg tablet TAKE 1 TABLET BY MOUTH EVERY 6 HOURS AS NEEDED FOR PAIN 28 tablet 2     No current facility-administered medications for this visit.       PMH:  Past Medical History:   Diagnosis Date    Acute myocardial infarction, unspecified (CMS/[facility]) 02/18/2015    AMI (acute myocardial infarction)    Other conditions influencing health status 04/23/2017    History of cough    Personal history of other specified conditions 05/08/2022    History of elevated prostate specific antigen (PSA)    Sacrococcygeal disorders, not elsewhere classified 05/12/2015    Sacro-iliac pain       PSH:  Past Surgical History:   Procedure Laterality Date    CORONARY ANGIOPLASTY WITH STENT PLACEMENT  05/17/2016    Cath Stent Placement    OTHER SURGICAL HISTORY  05/02/2014    Secondary Repair Of Ruptured Achilles Tendon    OTHER SURGICAL HISTORY  05/02/2014    Tenodesis Of Biceps Tendon At Elbow    US GUIDED ASPIRATION OF ABSCESS, HEMATOMA, CYST  8/14/2015    US GUIDED ASPIRATION OF ABSCESS, HEMATOMA, CYST 8/14/2015 [facility] ANCILLARY LEGACY       FMH:  Family History   Problem Relation Name Age of Onset    Diverticulosis Mother      Diabetes Father      Prostate cancer Father      Coronary artery disease Other Grandparent     Cancer Other      Diabetes Other         SHx:  Social History     Tobacco Use    Smoking status: Never     Smokeless tobacco: Never   Substance Use Topics    Alcohol use: Yes     Alcohol/week: 3.0 standard drinks of alcohol     Types: 3 Cans of beer per week     Comment: per week    Drug use: Never       Allergies:  Allergies   Allergen Reactions    Cat Dander Other     Sneezing, congestion    Cat's Claw Other    Clindamycin Rash    Penicillins Rash and Other     Hasn't happened since he was a little child, thinks rash and swelling were the reactions     Cystoscopy    Date/Time: 11/30/2023 9:40 AM    Performed by: Wicho Doss MD  Authorized by: Wicho Doss MD    Procedure - Bladder Cystoscopy:     Procedure details: cystoscopy    Comments:      Procedure:  After informed consent was obtained, the patient was taken to the procedure room for cystoscopy to better evaluate channel.     Cystoscopy     Procedure Note:    A sterile prep and drape was performed in standard fashion. Lidocaine was used for topical anesthesia. A flexible cystoscope was inserted into the urethra without difficulty revealing normal urethra.     The prostate was bilobar, approximately 60g with a right lateral lobe that crosses the midline and obstructs the bladder neck.     Then entered the bladder revealing bladder mucosa with no erythematous patches or plaques, foreign bodies, stones or papillary lesions. The ureteral orifices were visualized bilaterally. These were orthotopic in location and effluxing clear urine. No masses were seen on retroflexion.     Post-Procedure:   The cystoscope was removed. The vital signs were stable . The patient tolerated the procedure well. There were no complications.     Assesment/Plan  We discussed surgical options for his prostate and bothersome LUTS. We discussed various options including TURP, greenlight, Rezum, Urolift, HoLEP. We discussed HoLEP as a size-independent procedure that maximally de-obstructs the prostate with relatively less postop healing and recovery as compared to other modalities. We  discussed the surgery in detail. Discussed the risks of bleeding, infection, scarring, transient incontinence, rare (<1%) risk of long-term incontinence. Discussed the perioperative pathway. Discussed the near certainty of permanent ejaculatory dysfunction, but likely no change to his baseline erectile function.     For now, the patient is not too bothered by his urinary sx at this time. We will follow up in 4mo for sx check. We will revisit surgery prn if his symptoms worsen or become bothersome.    Follow up in 4mo with PVR and sx check.    Scribe Attestation  By signing my name below, I, Melody Llamas Scrvicky   attest that this documentation has been prepared under the direction and in the presence of Wicho Doss MD.

## 2023-12-05 ENCOUNTER — TREATMENT (OUTPATIENT)
Dept: PHYSICAL THERAPY | Facility: CLINIC | Age: 68
End: 2023-12-05
Payer: MEDICARE

## 2023-12-05 DIAGNOSIS — Z96.642 STATUS POST TOTAL REPLACEMENT OF LEFT HIP: Primary | ICD-10-CM

## 2023-12-05 PROCEDURE — 97110 THERAPEUTIC EXERCISES: CPT | Mod: GP | Performed by: PHYSICAL THERAPIST

## 2023-12-05 NOTE — PROGRESS NOTES
Physical Therapy    Physical Therapy Treatment    Patient Name: Zan Reed  MRN: [id number]  Today's Date: 12/5/2023    Time Calculation  Start Time: 0830  Stop Time: 0925  Time Calculation (min): 55 min    Visit #: 6  Approved number of visits: med ashu  Auth Required No  Cert dates:10/3/23-12/12/23    Assessment:    Cues needed to prevent bilateral genu valgum during bosu squats and shuttle leg press.  Overall progressing very well post operatively and close to independent management.     Plan:   Recheck next visit-probable discharge   Help patient organize and prioritize HEP   Recommended pt contact cardiologist to see if he has any suggestions for someone that could make general fitness program for patient.     Current Problem  1. Status post total replacement of left hip  Follow Up In Physical Therapy          Subjective   General   Pt states his hip feels good still and is back to his normal ADL's without much difficulty.  Putting on shoes and socks is much better.  Hoping to get into a more regular exercise routine but unsure what he should be doing because of cardiac stent history. Did complete cardiac rehab 6 years ago.     Precautions   Running, heavy lifting on L side, avoid simultaneous > 90 deg flex and IR     Pain   0/10    Objective     Treatments:  Therapeutic Exercise:  NuStep L6 x 6' LEs only  Dynamic: marching, butt kick, open/close gate x 2 laps each  Side stepping BLUE TB at ankles x 40' x 2 R/L  Monster walks BLUE TB at ankles x 40' x 2 fwd/bwd  Lateral step up to high knee balance 8 inch step 10x R/Lx2  Bosu squats on flat side 15x2  Shuttle leg press DL squat 75# 10x3, SL 50# 10x3  Forward lunge 10x R/L    Education:  Flat feet and LE alignment, importance of wearing supportive shoes

## 2023-12-07 ENCOUNTER — TELEPHONE (OUTPATIENT)
Dept: CARDIOLOGY | Facility: CLINIC | Age: 68
End: 2023-12-07
Payer: MEDICARE

## 2023-12-08 DIAGNOSIS — N52.9 ERECTILE DYSFUNCTION, UNSPECIFIED ERECTILE DYSFUNCTION TYPE: ICD-10-CM

## 2023-12-08 DIAGNOSIS — J45.909 UNCOMPLICATED ASTHMA, UNSPECIFIED ASTHMA SEVERITY, UNSPECIFIED WHETHER PERSISTENT (HHS-HCC): ICD-10-CM

## 2023-12-08 RX ORDER — FLUTICASONE PROPIONATE AND SALMETEROL 100; 50 UG/1; UG/1
1 POWDER RESPIRATORY (INHALATION) EVERY 12 HOURS
Qty: 60 EACH | Refills: 3 | Status: SHIPPED | OUTPATIENT
Start: 2023-12-08 | End: 2024-02-09 | Stop reason: SDUPTHER

## 2023-12-08 RX ORDER — TADALAFIL 20 MG/1
20 TABLET ORAL AS NEEDED
Qty: 30 TABLET | Refills: 1 | Status: SHIPPED | OUTPATIENT
Start: 2023-12-08

## 2023-12-12 ENCOUNTER — APPOINTMENT (OUTPATIENT)
Dept: PHYSICAL THERAPY | Facility: CLINIC | Age: 68
End: 2023-12-12
Payer: MEDICARE

## 2023-12-26 ENCOUNTER — TREATMENT (OUTPATIENT)
Dept: PHYSICAL THERAPY | Facility: CLINIC | Age: 68
End: 2023-12-26
Payer: MEDICARE

## 2023-12-26 DIAGNOSIS — Z96.642 STATUS POST TOTAL REPLACEMENT OF LEFT HIP: ICD-10-CM

## 2023-12-26 PROCEDURE — 97110 THERAPEUTIC EXERCISES: CPT | Mod: GP | Performed by: PHYSICAL THERAPIST

## 2023-12-26 NOTE — PROGRESS NOTES
Physical Therapy  DISCHARGE  Physical Therapy Treatment    Patient Name: Zan Reed  MRN: [id number]  Today's Date: 12/26/2023    Time Calculation  Start Time: 1210  Stop Time: 1255  Time Calculation (min): 45 min    Visit #: 7  Approved number of visits: med nec  Auth Required No  Cert dates:10/3/23-12/12/23  Re-cert date 12/26/23-12/28/23    Assessment:    Pt has made excellent progress toward goals and is independent with HEP.  Spent a large portion of treatment session educating patient about exercise, specific parameters, do's and dont's. Recommended patient call cardiologist to discuss cardio options.     Plan:   Discharge patient to independent HEP    Pt will meet the following goals in 10 weeks  Pt will report <0-2/10 pain with ADL's and functional mobility. met  Pt will be independent with HEP at least 3 days per week to maintain gains made in therapy. met  Pt will increase bilateral hip strength 5/5 to help control squatting, stooping, stairs, lifting, pushing, etc. met  Pt will increase left hip flexion AROM > 115 to ease don/doffing socks and shoes almost met  LEFS > 65 progressing toward  Pt will return to bowling 1x per week without pain or limitation met    Current Problem  1. Status post total replacement of left hip  Follow Up In Physical Therapy          Subjective   General   Pt states his hip feels great. No issues with functional activities at home.  HEP going well but admits he has been a little lax lately due to holidays.     Precautions   Running, heavy lifting on L side, avoid simultaneous > 90 deg flex and IR     Pain   0/10    Objective        Strength (MMT):  Grossly 5/5 throughout bilateral hips     Knee extension: R WNL, L WNL  Knee flexion: R WNL, L WNL    Range of Motion (degrees of motion):  Hip flexion: R 120, L 110    Outcome Measure:  Lower Extremity Funtional Score (LEFS): 63  Treatments:  Therapeutic Exercise:  Recheck   NuStep L6 x 6' LEs only  Dynamic: marching, butt  kick, open/close gate x 2 laps each  Side stepping BLUE TB at ankles x 40' x 2 R/L  Monster walks BLUE TB at ankles x 40' x 2 fwd/bwd  Lateral step up to high knee balance 8 inch step 15x R/Lx  Bosu squats on flat side 15x2  Shuttle leg press DL squat 75# 10x3, SL 50# 10x3  Forward lunge 10x R/L    Education:  HEP, exercise benefits, do's dont's, bowling technique

## 2024-01-09 DIAGNOSIS — N13.8 BPH WITH OBSTRUCTION/LOWER URINARY TRACT SYMPTOMS: ICD-10-CM

## 2024-01-09 DIAGNOSIS — N40.1 BPH WITH OBSTRUCTION/LOWER URINARY TRACT SYMPTOMS: ICD-10-CM

## 2024-01-09 RX ORDER — TADALAFIL 5 MG/1
5 TABLET ORAL DAILY
Qty: 90 TABLET | Refills: 3 | Status: SHIPPED | OUTPATIENT
Start: 2024-01-09 | End: 2024-01-12 | Stop reason: SDUPTHER

## 2024-01-12 DIAGNOSIS — N13.8 BPH WITH OBSTRUCTION/LOWER URINARY TRACT SYMPTOMS: ICD-10-CM

## 2024-01-12 DIAGNOSIS — N40.1 BPH WITH OBSTRUCTION/LOWER URINARY TRACT SYMPTOMS: ICD-10-CM

## 2024-01-12 RX ORDER — TADALAFIL 5 MG/1
5 TABLET ORAL DAILY
Qty: 90 TABLET | Refills: 3 | Status: SHIPPED | OUTPATIENT
Start: 2024-01-12

## 2024-02-02 ENCOUNTER — APPOINTMENT (OUTPATIENT)
Dept: UROLOGY | Facility: HOSPITAL | Age: 69
End: 2024-02-02
Payer: MEDICARE

## 2024-02-09 ENCOUNTER — APPOINTMENT (OUTPATIENT)
Dept: UROLOGY | Facility: HOSPITAL | Age: 69
End: 2024-02-09
Payer: COMMERCIAL

## 2024-02-09 ENCOUNTER — TELEPHONE (OUTPATIENT)
Dept: PRIMARY CARE | Facility: CLINIC | Age: 69
End: 2024-02-09
Payer: MEDICARE

## 2024-02-09 DIAGNOSIS — J45.909 UNCOMPLICATED ASTHMA, UNSPECIFIED ASTHMA SEVERITY, UNSPECIFIED WHETHER PERSISTENT (HHS-HCC): ICD-10-CM

## 2024-02-09 RX ORDER — FLUTICASONE PROPIONATE AND SALMETEROL 100; 50 UG/1; UG/1
1 POWDER RESPIRATORY (INHALATION) EVERY 12 HOURS
Qty: 60 EACH | Refills: 3 | Status: SHIPPED | OUTPATIENT
Start: 2024-02-09

## 2024-02-16 ENCOUNTER — OFFICE VISIT (OUTPATIENT)
Dept: UROLOGY | Facility: HOSPITAL | Age: 69
End: 2024-02-16
Payer: MEDICARE

## 2024-02-16 ENCOUNTER — LAB (OUTPATIENT)
Dept: LAB | Facility: LAB | Age: 69
End: 2024-02-16
Payer: MEDICARE

## 2024-02-16 DIAGNOSIS — N40.1 BPH WITH OBSTRUCTION/LOWER URINARY TRACT SYMPTOMS: Primary | ICD-10-CM

## 2024-02-16 DIAGNOSIS — I25.10 CORONARY ARTERY DISEASE INVOLVING NATIVE HEART WITHOUT ANGINA PECTORIS, UNSPECIFIED VESSEL OR LESION TYPE: ICD-10-CM

## 2024-02-16 DIAGNOSIS — R97.20 ELEVATED PSA: ICD-10-CM

## 2024-02-16 DIAGNOSIS — N13.8 BPH WITH OBSTRUCTION/LOWER URINARY TRACT SYMPTOMS: Primary | ICD-10-CM

## 2024-02-16 LAB — PSA SERPL-MCNC: 3.67 NG/ML

## 2024-02-16 PROCEDURE — 99213 OFFICE O/P EST LOW 20 MIN: CPT | Performed by: UROLOGY

## 2024-02-16 PROCEDURE — 36415 COLL VENOUS BLD VENIPUNCTURE: CPT

## 2024-02-16 PROCEDURE — 1125F AMNT PAIN NOTED PAIN PRSNT: CPT | Performed by: UROLOGY

## 2024-02-16 PROCEDURE — 1036F TOBACCO NON-USER: CPT | Performed by: UROLOGY

## 2024-02-16 PROCEDURE — 84153 ASSAY OF PSA TOTAL: CPT

## 2024-02-16 RX ORDER — CLOPIDOGREL BISULFATE 75 MG/1
75 TABLET ORAL DAILY
Qty: 90 TABLET | Refills: 3 | Status: SHIPPED | OUTPATIENT
Start: 2024-02-16

## 2024-02-16 NOTE — PROGRESS NOTES
FUV    Last seen - 8/3/23     HISTORY OF PRESENT ILLNESS:   aZn Reed is a 68 y.o. male who is being seen today for FUV for elevated PSA. He is on cialis and viagra daily. He explains when he started out on the viagra it was helpful. Then when he followed up with us we put him on low dose cialis. He notes that when he called for a refill, his medications were mixed up. He is still happy with how it is working.     His urination has improved. He has some urgency occasionally. Dr. Doss informed him he had some blockage but nothing serious. He will follow up on that in the future  -Fhx of prostate CA (father dx in his 60s)     AMI 21  AUA 4      PSA trend:  -3.85 on 6/17/23  -3.9 with 18% on 7/29/22  -5.61 on 5/7/22  -2.44 on 8/14/20    PAST MEDICAL HISTORY:  Past Medical History:   Diagnosis Date    Acute myocardial infarction, unspecified (CMS/[facility]) 02/18/2015    AMI (acute myocardial infarction)    Other conditions influencing health status 04/23/2017    History of cough    Personal history of other specified conditions 05/08/2022    History of elevated prostate specific antigen (PSA)    Sacrococcygeal disorders, not elsewhere classified 05/12/2015    Sacro-iliac pain       PAST SURGICAL HISTORY:  Past Surgical History:   Procedure Laterality Date    CORONARY ANGIOPLASTY WITH STENT PLACEMENT  05/17/2016    Cath Stent Placement    OTHER SURGICAL HISTORY  05/02/2014    Secondary Repair Of Ruptured Achilles Tendon    OTHER SURGICAL HISTORY  05/02/2014    Tenodesis Of Biceps Tendon At Elbow    US GUIDED ASPIRATION OF ABSCESS, HEMATOMA, CYST  8/14/2015    US GUIDED ASPIRATION OF ABSCESS, HEMATOMA, CYST 8/14/2015 [facility] ANCILLARY LEGACY        ALLERGIES:   Allergies   Allergen Reactions    Cat Dander Other     Sneezing, congestion    Cat's Claw Other    Clindamycin Rash    Penicillins Rash and Other     Hasn't happened since he was a little child, thinks rash and swelling were the reactions         MEDICATIONS:   Current Outpatient Medications   Medication Instructions    acetaminophen (Tylenol) 500 mg tablet TAKE 1 TABLET BY MOUTH EVERY 6 HOURS    albuterol 90 mcg/actuation inhaler inhalation, As needed    aspirin (Adult Low Dose Aspirin) 81 mg EC tablet 1 tablet, oral, Daily    aspirin 81 mg EC tablet TAKE 1 TABLET BY MOUTH TWO TIMES A DAY    atorvastatin (LIPITOR) 80 mg, oral, Daily    calcium carbonate-vitamin D3 500 mg-5 mcg (200 unit) tablet 1 tablet, oral, Daily    chlorhexidine (Peridex) 0.12 % solution 15 mL, Mouth/Throat, Daily    cholecalciferol (Vitamin D3) 25 MCG (1000 UT) capsule oral    clopidogrel (PLAVIX) 75 mg, oral, Daily    dextromethorphan-guaifenesin (Mucinex DM)  mg 12 hr tablet 1-2 tablets, oral, Every 12 hours PRN, Do not crush, chew, or split.    docusate sodium (Colace) 100 mg capsule TAKE 1 CAPSULE BY MOUTH TWO TIMES A DAY    doxycycline (Vibra-Tabs) 100 mg tablet TAKE 1 TABLET 1 HOUR BEFORE APPOINTMENT ,THEN TAKE 1 TABLET ONCE A DAY FOR 2 DAYS ATER PROCEDURE    doxycycline (Vibramycin) 50 mg capsule     fluticasone propion-salmeteroL (Advair Diskus) 100-50 mcg/dose diskus inhaler 1 puff, inhalation, Every 12 hours    gabapentin (Neurontin) 300 mg capsule 1 capsule, oral, Nightly    levothyroxine (LevoxyL) 100 mcg tablet oral    levothyroxine (SYNTHROID, LEVOXYL) 150 mcg, oral, Daily    meloxicam (MOBIC) 15 mg, oral, Daily PRN    metoprolol tartrate (LOPRESSOR) 25 mg, oral, 2 times daily    metroNIDAZOLE (Metrolotion) 0.75 % lotion lotion     multivitamin capsule 1 tablet orally daily    nitroglycerin (Nitrostat) 0.4 mg SL tablet sublingual    omega 3-dha-epa-fish oil (Fish OiL) 1,000 mg (120 mg-180 mg) capsule oral    oxyCODONE (Roxicodone) 5 mg immediate release tablet TAKE 1 TABLET BY MOUTH EVERY 6 HOURS AS NEEDED FOR PAIN    pantoprazole (ProtoNix) 40 mg EC tablet TAKE 1 TABLET BY MOUTH ONCE DAILY    sildenafil (Viagra) 100 mg tablet take 1 tablet by mouth once daily 1  "hour BEFORE NEEDED    sulfamethoxazole-trimethoprim (Bactrim DS) 800-160 mg tablet 1 tablet, oral, 2 times daily    tadalafil (CIALIS) 20 mg, oral, As needed, 1 hour before activity    tadalafil (CIALIS) 5 mg, oral, Daily    traMADol (Ultram) 50 mg tablet take 1 tablet by mouth every 6 to 8 hours if needed for pain    traMADol (Ultram) 50 mg tablet TAKE 1 TABLET BY MOUTH EVERY 6 HOURS AS NEEDED FOR PAIN        PHYSICAL EXAM:  There were no vitals taken for this visit.  Constitutional: Patient appears well-developed and well-nourished. No distress.    Pulmonary/Chest: Effort normal. No respiratory distress.   Abdominal: Soft, ND NT  : WNL  Musculoskeletal: Normal range of motion.    Neurological: Alert and oriented to person, place, and time.  Psychiatric: Normal mood and affect. Behavior is normal. Thought content normal.      Labs:  No results found for: \"TESTOSTERONE\"  Lab Results   Component Value Date    PSA 3.85 06/17/2023     No components found for: \"CBC\"  Lab Results   Component Value Date    CREATININE CANCELED 08/31/2023     No components found for: \"TESTOTMS\"  No results found for: \"TESTF\"    Imaging:    Discussion:      Assessment:      1. BPH with obstruction/lower urinary tract symptoms  Measure post void residual      2. Elevated PSA  PSA          Zan Reed is a 68 y.o. male here for FUV     Plan:   1) We will follow up in 6 months with a PSA prior. We will also obtain his PSA today as he did not complete that before this visit.     2) Follow up with Dr. Doss as scheduled.     All questions and concerns were addressed. Patient verbalizes understanding and has no other questions at this time.     Scribe Attestation  By signing my name below, IWendi Scribe   attest that this documentation has been prepared under the direction and in the presence of Hayder John MD.  "

## 2024-02-22 DIAGNOSIS — E78.5 HYPERLIPIDEMIA, UNSPECIFIED HYPERLIPIDEMIA TYPE: ICD-10-CM

## 2024-02-22 RX ORDER — ATORVASTATIN CALCIUM 80 MG/1
80 TABLET, FILM COATED ORAL DAILY
Qty: 90 TABLET | Refills: 3 | Status: SHIPPED | OUTPATIENT
Start: 2024-02-22 | End: 2025-02-21

## 2024-03-22 ENCOUNTER — OFFICE VISIT (OUTPATIENT)
Dept: UROLOGY | Facility: HOSPITAL | Age: 69
End: 2024-03-22
Payer: MEDICARE

## 2024-03-22 DIAGNOSIS — N13.8 BPH WITH OBSTRUCTION/LOWER URINARY TRACT SYMPTOMS: Primary | ICD-10-CM

## 2024-03-22 DIAGNOSIS — N40.1 BPH WITH OBSTRUCTION/LOWER URINARY TRACT SYMPTOMS: Primary | ICD-10-CM

## 2024-03-22 PROCEDURE — 99214 OFFICE O/P EST MOD 30 MIN: CPT | Performed by: UROLOGY

## 2024-03-22 PROCEDURE — 1159F MED LIST DOCD IN RCRD: CPT | Performed by: UROLOGY

## 2024-03-22 PROCEDURE — 1036F TOBACCO NON-USER: CPT | Performed by: UROLOGY

## 2024-03-22 PROCEDURE — 1160F RVW MEDS BY RX/DR IN RCRD: CPT | Performed by: UROLOGY

## 2024-03-22 NOTE — PROGRESS NOTES
HPI  68 year old male with BPH, incomplete emptying, referred by Dr. John for possible HoLEP.     Seen 6/23/23, PVR 246cc. Also with ED, ePSA with +Fhx PCa in his dad. Started on daily tadalafil. Comes in today for follow up, PVR 69cc after double voiding. Reports weak stream, some urgency and frequency, but mostly bothersome obstructive symptoms. Inclined to have his prostate surgically addressed. No UTIs, complete retention, hematuria. He is getting a hip replacement in 6 weeks. No cross-sectional imaging to determine prostate volume.     PSA history:   6/2023 - 3.87  7/2022 - 3.9 (18% free)  5/2022 - 5.61         11/30/23 - seen today for cystoscopy to evaluate his channel and to discuss surgery. Symptoms are fairly mild. Took 1 pill abx this AM given recent hip replacement.        3/22/24 - seen today for PVR + symptom check.  PVR 122cc. Voiding well, no urinary complaints today. Continues Cialis 5mg po qd.    Lab Results   Component Value Date    PSA 3.67 02/16/2024    PSA 3.85 06/17/2023    PSA 3.9 07/29/2022    PSA 5.61 (H) 05/07/2022    PSA 2.44 08/14/2020       Current Medications:  Current Outpatient Medications   Medication Sig Dispense Refill    acetaminophen (Tylenol) 500 mg tablet TAKE 1 TABLET BY MOUTH EVERY 6 HOURS 120 tablet 0    albuterol 90 mcg/actuation inhaler Inhale if needed.      aspirin (Adult Low Dose Aspirin) 81 mg EC tablet Take 1 tablet (81 mg) by mouth once daily.      aspirin 81 mg EC tablet TAKE 1 TABLET BY MOUTH TWO TIMES A DAY 60 tablet 0    atorvastatin (Lipitor) 80 mg tablet Take 1 tablet (80 mg) by mouth once daily. 90 tablet 3    calcium carbonate-vitamin D3 500 mg-5 mcg (200 unit) tablet Take 1 tablet by mouth once daily.      chlorhexidine (Peridex) 0.12 % solution Use 15 mL in the mouth or throat once daily.      cholecalciferol (Vitamin D3) 25 MCG (1000 UT) capsule Take by mouth.      clopidogrel (Plavix) 75 mg tablet Take 1 tablet (75 mg) by mouth once daily. 90 tablet 3     dextromethorphan-guaifenesin (Mucinex DM)  mg 12 hr tablet Take 1-2 tablets by mouth every 12 hours if needed. Do not crush, chew, or split.      docusate sodium (Colace) 100 mg capsule TAKE 1 CAPSULE BY MOUTH TWO TIMES A DAY 60 capsule 0    doxycycline (Vibra-Tabs) 100 mg tablet TAKE 1 TABLET 1 HOUR BEFORE APPOINTMENT ,THEN TAKE 1 TABLET ONCE A DAY FOR 2 DAYS ATER PROCEDURE      doxycycline (Vibramycin) 50 mg capsule       fluticasone propion-salmeteroL (Advair Diskus) 100-50 mcg/dose diskus inhaler Inhale 1 puff every 12 hours. 60 each 3    gabapentin (Neurontin) 300 mg capsule Take 1 capsule (300 mg) by mouth once daily at bedtime.      levothyroxine (LevoxyL) 100 mcg tablet Take by mouth.      levothyroxine (Synthroid, Levoxyl) 150 mcg tablet Take 1 tablet (150 mcg) by mouth once daily.      meloxicam (Mobic) 15 mg tablet Take 1 tablet (15 mg) by mouth once daily as needed.      metoprolol tartrate (Lopressor) 25 mg tablet Take 1 tablet (25 mg) by mouth 2 times a day.      metroNIDAZOLE (Metrolotion) 0.75 % lotion lotion       multivitamin capsule 1 tablet orally daily      nitroglycerin (Nitrostat) 0.4 mg SL tablet Place under the tongue.      omega 3-dha-epa-fish oil (Fish OiL) 1,000 mg (120 mg-180 mg) capsule Take by mouth.      pantoprazole (ProtoNix) 40 mg EC tablet TAKE 1 TABLET BY MOUTH ONCE DAILY 30 tablet 0    sildenafil (Viagra) 100 mg tablet take 1 tablet by mouth once daily 1 hour BEFORE NEEDED      sulfamethoxazole-trimethoprim (Bactrim DS) 800-160 mg tablet Take 1 tablet by mouth 2 times a day.      tadalafil (Cialis) 5 mg tablet Take 1 tablet (5 mg) by mouth once daily. 90 tablet 3    tadalafil 20 mg tablet Take 1 tablet (20 mg) by mouth if needed for erectile dysfunction. 1 hour before activity 30 tablet 1    traMADol (Ultram) 50 mg tablet take 1 tablet by mouth every 6 to 8 hours if needed for pain       No current facility-administered medications for this visit.        Active  Problems:  Zan Reed is a 68 y.o. male with the following Problems and Medications.  Patient Active Problem List   Diagnosis    Abnormality of femur    Acquired spondylolisthesis    Allergic reaction    Arthritis of hand, left, degenerative    Asthma    Baker cyst    Cellulitis of right upper extremity    Olecranon bursitis of right elbow    Chest pain    Chronic pain    Contusion of left forearm    Coronary artery disease    Cough    ED (erectile dysfunction)    Elevated PSA    Eye redness    Fall from ground level    Fatigue    Frequent urination    Glycosuria    H/O joint replacement    Hyperglycemia    Hyperlipidemia    Hypothyroidism    Insomnia    Left lumbar radiculopathy    Lumbar spinal stenosis    Mechanical low back pain    Osteoarthritis of both knees    Osteoarthrosis of knee    Prostatitis    Pain in joint, lower leg    Right ankle pain    Pain and swelling of left wrist    Right elbow pain    Sacro-iliac pain    Sprain of anterior talofibular ligament of right ankle    Vitamin D deficiency    Hypothyroidism due to Hashimoto's thyroiditis    Mild intermittent asthma without complication    Coronary artery disease involving native heart without angina pectoris    Chronic fatigue    Dermatosis    Arthritis of left hip    Blood glucose elevated    BPH with obstruction/lower urinary tract symptoms    Elevated fasting blood sugar    Incomplete emptying of bladder    Low back pain    Other low back pain    Overweight with body mass index (BMI) of 27 to 27.9 in adult    Traumatic hematoma of forearm, left, initial encounter    Status post total replacement of left hip     Current Outpatient Medications   Medication Sig Dispense Refill    acetaminophen (Tylenol) 500 mg tablet TAKE 1 TABLET BY MOUTH EVERY 6 HOURS 120 tablet 0    albuterol 90 mcg/actuation inhaler Inhale if needed.      aspirin (Adult Low Dose Aspirin) 81 mg EC tablet Take 1 tablet (81 mg) by mouth once daily.      aspirin 81 mg EC  tablet TAKE 1 TABLET BY MOUTH TWO TIMES A DAY 60 tablet 0    atorvastatin (Lipitor) 80 mg tablet Take 1 tablet (80 mg) by mouth once daily. 90 tablet 3    calcium carbonate-vitamin D3 500 mg-5 mcg (200 unit) tablet Take 1 tablet by mouth once daily.      chlorhexidine (Peridex) 0.12 % solution Use 15 mL in the mouth or throat once daily.      cholecalciferol (Vitamin D3) 25 MCG (1000 UT) capsule Take by mouth.      clopidogrel (Plavix) 75 mg tablet Take 1 tablet (75 mg) by mouth once daily. 90 tablet 3    dextromethorphan-guaifenesin (Mucinex DM)  mg 12 hr tablet Take 1-2 tablets by mouth every 12 hours if needed. Do not crush, chew, or split.      docusate sodium (Colace) 100 mg capsule TAKE 1 CAPSULE BY MOUTH TWO TIMES A DAY 60 capsule 0    doxycycline (Vibra-Tabs) 100 mg tablet TAKE 1 TABLET 1 HOUR BEFORE APPOINTMENT ,THEN TAKE 1 TABLET ONCE A DAY FOR 2 DAYS ATER PROCEDURE      doxycycline (Vibramycin) 50 mg capsule       fluticasone propion-salmeteroL (Advair Diskus) 100-50 mcg/dose diskus inhaler Inhale 1 puff every 12 hours. 60 each 3    gabapentin (Neurontin) 300 mg capsule Take 1 capsule (300 mg) by mouth once daily at bedtime.      levothyroxine (LevoxyL) 100 mcg tablet Take by mouth.      levothyroxine (Synthroid, Levoxyl) 150 mcg tablet Take 1 tablet (150 mcg) by mouth once daily.      meloxicam (Mobic) 15 mg tablet Take 1 tablet (15 mg) by mouth once daily as needed.      metoprolol tartrate (Lopressor) 25 mg tablet Take 1 tablet (25 mg) by mouth 2 times a day.      metroNIDAZOLE (Metrolotion) 0.75 % lotion lotion       multivitamin capsule 1 tablet orally daily      nitroglycerin (Nitrostat) 0.4 mg SL tablet Place under the tongue.      omega 3-dha-epa-fish oil (Fish OiL) 1,000 mg (120 mg-180 mg) capsule Take by mouth.      pantoprazole (ProtoNix) 40 mg EC tablet TAKE 1 TABLET BY MOUTH ONCE DAILY 30 tablet 0    sildenafil (Viagra) 100 mg tablet take 1 tablet by mouth once daily 1 hour BEFORE  NEEDED      sulfamethoxazole-trimethoprim (Bactrim DS) 800-160 mg tablet Take 1 tablet by mouth 2 times a day.      tadalafil (Cialis) 5 mg tablet Take 1 tablet (5 mg) by mouth once daily. 90 tablet 3    tadalafil 20 mg tablet Take 1 tablet (20 mg) by mouth if needed for erectile dysfunction. 1 hour before activity 30 tablet 1    traMADol (Ultram) 50 mg tablet take 1 tablet by mouth every 6 to 8 hours if needed for pain       No current facility-administered medications for this visit.       PMH:  Past Medical History:   Diagnosis Date    Acute myocardial infarction, unspecified (CMS/[facility]) 02/18/2015    AMI (acute myocardial infarction)    Other conditions influencing health status 04/23/2017    History of cough    Personal history of other specified conditions 05/08/2022    History of elevated prostate specific antigen (PSA)    Sacrococcygeal disorders, not elsewhere classified 05/12/2015    Sacro-iliac pain       PSH:  Past Surgical History:   Procedure Laterality Date    CORONARY ANGIOPLASTY WITH STENT PLACEMENT  05/17/2016    Cath Stent Placement    OTHER SURGICAL HISTORY  05/02/2014    Secondary Repair Of Ruptured Achilles Tendon    OTHER SURGICAL HISTORY  05/02/2014    Tenodesis Of Biceps Tendon At Elbow    US GUIDED ASPIRATION OF ABSCESS, HEMATOMA, CYST  8/14/2015    US GUIDED ASPIRATION OF ABSCESS, HEMATOMA, CYST 8/14/2015 [facility] ANCILLARY LEGACY       FMH:  Family History   Problem Relation Name Age of Onset    Diverticulosis Mother      Diabetes Father      Prostate cancer Father      Coronary artery disease Other Grandparent     Cancer Other      Diabetes Other         SHx:  Social History     Tobacco Use    Smoking status: Never    Smokeless tobacco: Never   Substance Use Topics    Alcohol use: Yes     Alcohol/week: 3.0 standard drinks of alcohol     Types: 3 Cans of beer per week     Comment: per week    Drug use: Never       Allergies:  Allergies   Allergen Reactions    Cat Dander Other     Sneezing,  congestion    Cat's Claw Other    Clindamycin Rash    Penicillins Rash and Other     Hasn't happened since he was a little child, thinks rash and swelling were the reactions     Assesment/Plan  Patient is voiding well now, and has no urinary complaints today. He is happy with where he is at. We discussed reasons to do anything would be bothersome symptoms, worsening symptoms, or red flag symptoms including infections, hematuria, and/or retention. With the absence of bothersome symptoms and red flag symptoms, there is no reason to change his current regimen. He will continue his Cialis 5mg po qd and we will follow up in 1 year.       Scribe Attestation  By signing my name below, I, Jeniffer Dorsey   attest that this documentation has been prepared under the direction and in the presence of Wicho Doss MD.

## 2024-07-03 ENCOUNTER — TELEPHONE (OUTPATIENT)
Dept: PRIMARY CARE | Facility: CLINIC | Age: 69
End: 2024-07-03
Payer: MEDICARE

## 2024-07-05 NOTE — TELEPHONE ENCOUNTER
Called patient and left a voice mail returning his call about questions about his medication change.     Felicitas Basilio MA

## 2024-07-31 PROBLEM — J40 BRONCHITIS: Status: ACTIVE | Noted: 2024-07-31

## 2024-07-31 PROBLEM — R00.2 PALPITATIONS: Status: ACTIVE | Noted: 2024-07-31

## 2024-07-31 PROBLEM — L98.9 DISORDER OF SKIN OR SUBCUTANEOUS TISSUE: Status: ACTIVE | Noted: 2024-07-31

## 2024-07-31 PROBLEM — I21.9 ACUTE MYOCARDIAL INFARCTION (MULTI): Status: ACTIVE | Noted: 2024-07-31

## 2024-08-08 ENCOUNTER — APPOINTMENT (OUTPATIENT)
Dept: PRIMARY CARE | Facility: CLINIC | Age: 69
End: 2024-08-08
Payer: MEDICARE

## 2024-08-08 VITALS
HEART RATE: 56 BPM | HEIGHT: 71 IN | SYSTOLIC BLOOD PRESSURE: 121 MMHG | DIASTOLIC BLOOD PRESSURE: 73 MMHG | WEIGHT: 195.4 LBS | TEMPERATURE: 98.5 F | BODY MASS INDEX: 27.35 KG/M2

## 2024-08-08 DIAGNOSIS — R73.9 HYPERGLYCEMIA: ICD-10-CM

## 2024-08-08 DIAGNOSIS — I25.10 CORONARY ARTERY DISEASE INVOLVING NATIVE HEART WITHOUT ANGINA PECTORIS, UNSPECIFIED VESSEL OR LESION TYPE: ICD-10-CM

## 2024-08-08 DIAGNOSIS — Z00.00 ROUTINE GENERAL MEDICAL EXAMINATION AT A HEALTH CARE FACILITY: Primary | ICD-10-CM

## 2024-08-08 DIAGNOSIS — E78.5 HYPERLIPIDEMIA, UNSPECIFIED HYPERLIPIDEMIA TYPE: ICD-10-CM

## 2024-08-08 DIAGNOSIS — N52.9 ERECTILE DYSFUNCTION, UNSPECIFIED ERECTILE DYSFUNCTION TYPE: ICD-10-CM

## 2024-08-08 DIAGNOSIS — R97.20 ELEVATED PSA: ICD-10-CM

## 2024-08-08 DIAGNOSIS — N41.9 PROSTATITIS, UNSPECIFIED PROSTATITIS TYPE: ICD-10-CM

## 2024-08-08 PROCEDURE — 3008F BODY MASS INDEX DOCD: CPT | Performed by: INTERNAL MEDICINE

## 2024-08-08 PROCEDURE — 1170F FXNL STATUS ASSESSED: CPT | Performed by: INTERNAL MEDICINE

## 2024-08-08 PROCEDURE — G0439 PPPS, SUBSEQ VISIT: HCPCS | Performed by: INTERNAL MEDICINE

## 2024-08-08 RX ORDER — SILDENAFIL 100 MG/1
100 TABLET, FILM COATED ORAL AS NEEDED
Qty: 30 TABLET | Refills: 1 | Status: SHIPPED | OUTPATIENT
Start: 2024-08-08

## 2024-08-08 RX ORDER — ATORVASTATIN CALCIUM 80 MG/1
80 TABLET, FILM COATED ORAL DAILY
Qty: 90 TABLET | Refills: 3 | Status: SHIPPED | OUTPATIENT
Start: 2024-08-08 | End: 2025-08-08

## 2024-08-08 ASSESSMENT — PATIENT HEALTH QUESTIONNAIRE - PHQ9
2. FEELING DOWN, DEPRESSED OR HOPELESS: NOT AT ALL
1. LITTLE INTEREST OR PLEASURE IN DOING THINGS: NOT AT ALL
SUM OF ALL RESPONSES TO PHQ9 QUESTIONS 1 AND 2: 0

## 2024-08-08 ASSESSMENT — ACTIVITIES OF DAILY LIVING (ADL)
DOING_HOUSEWORK: INDEPENDENT
MANAGING_FINANCES: INDEPENDENT
GROCERY_SHOPPING: INDEPENDENT
DRESSING: INDEPENDENT
TAKING_MEDICATION: INDEPENDENT
BATHING: INDEPENDENT

## 2024-08-08 NOTE — PROGRESS NOTES
"Subjective   Reason for Visit: Zan Reed is an 68 y.o. male here for a Medicare Wellness visit.      CAD    HLD    Hypothyroid         HPI    Patient Care Team:  Jakob Lr MD as PCP - General (Internal Medicine)  Kelly Evans MD PhD as PCP - [facility] PCP     Review of Systems      No Fever/chills/headaches/dizziness/chest pains/ cough/ shortness of breath/palpitations/ abdominal pain /Nausea/vomiting/diarrhea/ constipation/urine frequency/blood in urine.      Objective   Vitals:  /73 (BP Location: Left arm, Patient Position: Sitting, BP Cuff Size: Adult)   Pulse 56   Temp 36.9 °C (98.5 °F) (Oral)   Ht 1.791 m (5' 10.51\")   Wt 88.6 kg (195 lb 6.4 oz)   BMI 27.63 kg/m²       Physical Exam      No JVP elevation. No palpable Lymph Nodes. No Thyromegaly    HEENT- Negative    CVS-NL S1/S2 . No MRG    Lungs-CTA. B/S= B/L    Abdomen-Soft, Non-tender. No masses or HSM    Extremities: No C/C/E    Skin-No abnormal moles/rash        Assessment/Plan   Problem List Items Addressed This Visit             ICD-10-CM    Coronary artery disease I25.10    Relevant Orders    CBC and Auto Differential    Comprehensive Metabolic Panel    Elevated PSA R97.20    Relevant Orders    Prostate Specific Antigen, Screen    Hyperglycemia R73.9    Relevant Orders    Hemoglobin A1c    Hyperlipidemia E78.5    Relevant Medications    atorvastatin (Lipitor) 80 mg tablet    Other Relevant Orders    Lipid Panel    Prostatitis N41.9    Relevant Orders    Prostate Specific Antigen, Screen     Other Visit Diagnoses         Codes    Routine general medical examination at a health care facility     Z00.00    Relevant Orders    TSH with reflex to Free T4 if abnormal    Urinalysis with Reflex Microscopic         CAD    HLD    Hypothyroid    Continue with current Rx    Follow up in 12 months /PRN      "

## 2024-08-09 ENCOUNTER — LAB (OUTPATIENT)
Dept: LAB | Facility: LAB | Age: 69
End: 2024-08-09
Payer: MEDICARE

## 2024-08-09 DIAGNOSIS — I25.10 CORONARY ARTERY DISEASE INVOLVING NATIVE HEART WITHOUT ANGINA PECTORIS, UNSPECIFIED VESSEL OR LESION TYPE: ICD-10-CM

## 2024-08-09 DIAGNOSIS — E78.5 HYPERLIPIDEMIA, UNSPECIFIED HYPERLIPIDEMIA TYPE: ICD-10-CM

## 2024-08-09 DIAGNOSIS — R73.9 HYPERGLYCEMIA: ICD-10-CM

## 2024-08-09 DIAGNOSIS — R97.20 ELEVATED PSA: ICD-10-CM

## 2024-08-09 DIAGNOSIS — Z00.00 ROUTINE GENERAL MEDICAL EXAMINATION AT A HEALTH CARE FACILITY: ICD-10-CM

## 2024-08-09 DIAGNOSIS — N41.9 PROSTATITIS, UNSPECIFIED PROSTATITIS TYPE: ICD-10-CM

## 2024-08-09 LAB
ALBUMIN SERPL BCP-MCNC: 4.3 G/DL (ref 3.4–5)
ALP SERPL-CCNC: 91 U/L (ref 33–136)
ALT SERPL W P-5'-P-CCNC: 22 U/L (ref 10–52)
ANION GAP SERPL CALC-SCNC: 12 MMOL/L (ref 10–20)
APPEARANCE UR: CLEAR
AST SERPL W P-5'-P-CCNC: 19 U/L (ref 9–39)
BASOPHILS # BLD AUTO: 0.02 X10*3/UL (ref 0–0.1)
BASOPHILS NFR BLD AUTO: 0.4 %
BILIRUB SERPL-MCNC: 0.6 MG/DL (ref 0–1.2)
BILIRUB UR STRIP.AUTO-MCNC: NEGATIVE MG/DL
BUN SERPL-MCNC: 15 MG/DL (ref 6–23)
CALCIUM SERPL-MCNC: 8.7 MG/DL (ref 8.6–10.3)
CHLORIDE SERPL-SCNC: 105 MMOL/L (ref 98–107)
CHOLEST SERPL-MCNC: 107 MG/DL (ref 0–199)
CHOLESTEROL/HDL RATIO: 2.6
CO2 SERPL-SCNC: 27 MMOL/L (ref 21–32)
COLOR UR: ABNORMAL
CREAT SERPL-MCNC: 1.13 MG/DL (ref 0.5–1.3)
EGFRCR SERPLBLD CKD-EPI 2021: 71 ML/MIN/1.73M*2
EOSINOPHIL # BLD AUTO: 0.14 X10*3/UL (ref 0–0.7)
EOSINOPHIL NFR BLD AUTO: 3.1 %
ERYTHROCYTE [DISTWIDTH] IN BLOOD BY AUTOMATED COUNT: 15.1 % (ref 11.5–14.5)
EST. AVERAGE GLUCOSE BLD GHB EST-MCNC: 126 MG/DL
GLUCOSE SERPL-MCNC: 110 MG/DL (ref 74–99)
GLUCOSE UR STRIP.AUTO-MCNC: ABNORMAL MG/DL
HBA1C MFR BLD: 6 %
HCT VFR BLD AUTO: 43.1 % (ref 41–52)
HDLC SERPL-MCNC: 41.7 MG/DL
HGB BLD-MCNC: 14.2 G/DL (ref 13.5–17.5)
IMM GRANULOCYTES # BLD AUTO: 0.01 X10*3/UL (ref 0–0.7)
IMM GRANULOCYTES NFR BLD AUTO: 0.2 % (ref 0–0.9)
KETONES UR STRIP.AUTO-MCNC: NEGATIVE MG/DL
LDLC SERPL CALC-MCNC: 48 MG/DL
LEUKOCYTE ESTERASE UR QL STRIP.AUTO: NEGATIVE
LYMPHOCYTES # BLD AUTO: 1.38 X10*3/UL (ref 1.2–4.8)
LYMPHOCYTES NFR BLD AUTO: 30.9 %
MCH RBC QN AUTO: 25.4 PG (ref 26–34)
MCHC RBC AUTO-ENTMCNC: 32.9 G/DL (ref 32–36)
MCV RBC AUTO: 77 FL (ref 80–100)
MONOCYTES # BLD AUTO: 0.33 X10*3/UL (ref 0.1–1)
MONOCYTES NFR BLD AUTO: 7.4 %
NEUTROPHILS # BLD AUTO: 2.59 X10*3/UL (ref 1.2–7.7)
NEUTROPHILS NFR BLD AUTO: 58 %
NITRITE UR QL STRIP.AUTO: NEGATIVE
NON HDL CHOLESTEROL: 65 MG/DL (ref 0–149)
NRBC BLD-RTO: 0 /100 WBCS (ref 0–0)
PH UR STRIP.AUTO: 5.5 [PH]
PLATELET # BLD AUTO: 149 X10*3/UL (ref 150–450)
POTASSIUM SERPL-SCNC: 4 MMOL/L (ref 3.5–5.3)
PROT SERPL-MCNC: 6.5 G/DL (ref 6.4–8.2)
PROT UR STRIP.AUTO-MCNC: NEGATIVE MG/DL
PSA SERPL-MCNC: 4.69 NG/ML
RBC # BLD AUTO: 5.6 X10*6/UL (ref 4.5–5.9)
RBC # UR STRIP.AUTO: NEGATIVE /UL
SODIUM SERPL-SCNC: 140 MMOL/L (ref 136–145)
SP GR UR STRIP.AUTO: 1.02
TRIGL SERPL-MCNC: 85 MG/DL (ref 0–149)
TSH SERPL-ACNC: 1.48 MIU/L (ref 0.44–3.98)
UROBILINOGEN UR STRIP.AUTO-MCNC: NORMAL MG/DL
VLDL: 17 MG/DL (ref 0–40)
WBC # BLD AUTO: 4.5 X10*3/UL (ref 4.4–11.3)

## 2024-08-09 PROCEDURE — 36415 COLL VENOUS BLD VENIPUNCTURE: CPT

## 2024-08-09 PROCEDURE — 80061 LIPID PANEL: CPT

## 2024-08-09 PROCEDURE — 83036 HEMOGLOBIN GLYCOSYLATED A1C: CPT

## 2024-08-09 PROCEDURE — 84153 ASSAY OF PSA TOTAL: CPT

## 2024-08-09 PROCEDURE — 80053 COMPREHEN METABOLIC PANEL: CPT

## 2024-08-09 PROCEDURE — 85025 COMPLETE CBC W/AUTO DIFF WBC: CPT

## 2024-08-09 PROCEDURE — 81003 URINALYSIS AUTO W/O SCOPE: CPT

## 2024-08-09 PROCEDURE — 84443 ASSAY THYROID STIM HORMONE: CPT

## 2024-08-15 NOTE — PROGRESS NOTES
FUV    Last seen - 2/16/24     HISTORY OF PRESENT ILLNESS:   -elevated PSA  -Fhx of prostate CA (father dx in his 60s)     AMI 16  AUA 5    PSA trend:  -4.69 on 8/9/24  -3.85 on 6/17/23  -3.9 with 18% on 7/29/22  -5.61 on 5/7/22  -2.44 on 8/14/20    PAST MEDICAL HISTORY:  Past Medical History:   Diagnosis Date    Acute myocardial infarction, unspecified ([facility]) 02/18/2015    AMI (acute myocardial infarction)    Other conditions influencing health status 04/23/2017    History of cough    Personal history of other specified conditions 05/08/2022    History of elevated prostate specific antigen (PSA)    Sacrococcygeal disorders, not elsewhere classified 05/12/2015    Sacro-iliac pain       PAST SURGICAL HISTORY:  Past Surgical History:   Procedure Laterality Date    CORONARY ANGIOPLASTY WITH STENT PLACEMENT  05/17/2016    Cath Stent Placement    OTHER SURGICAL HISTORY  05/02/2014    Secondary Repair Of Ruptured Achilles Tendon    OTHER SURGICAL HISTORY  05/02/2014    Tenodesis Of Biceps Tendon At Elbow    US GUIDED ASPIRATION OF ABSCESS, HEMATOMA, CYST  8/14/2015    US GUIDED ASPIRATION OF ABSCESS, HEMATOMA, CYST 8/14/2015 [facility] ANCILLARY LEGACY        ALLERGIES:   Allergies   Allergen Reactions    Cat Dander Other     Sneezing, congestion    Cat's Claw Other    Clindamycin Rash    Penicillins Rash and Other     Hasn't happened since he was a little child, thinks rash and swelling were the reactions        MEDICATIONS:   Current Outpatient Medications   Medication Instructions    acetaminophen (Tylenol) 500 mg tablet TAKE 1 TABLET BY MOUTH EVERY 6 HOURS    albuterol 90 mcg/actuation inhaler inhalation, As needed    aspirin (Adult Low Dose Aspirin) 81 mg EC tablet 1 tablet, oral, Daily    aspirin 81 mg EC tablet TAKE 1 TABLET BY MOUTH TWO TIMES A DAY    atorvastatin (LIPITOR) 80 mg, oral, Daily    calcium carbonate-vitamin D3 500 mg-5 mcg (200 unit) tablet 1 tablet, oral, Daily    chlorhexidine (Peridex) 0.12 % solution  "15 mL, Mouth/Throat, Daily    cholecalciferol (Vitamin D3) 25 MCG (1000 UT) capsule oral    clopidogrel (PLAVIX) 75 mg, oral, Daily    dextromethorphan-guaifenesin (Mucinex DM)  mg 12 hr tablet 1-2 tablets, oral, Every 12 hours PRN, Do not crush, chew, or split.    docusate sodium (Colace) 100 mg capsule TAKE 1 CAPSULE BY MOUTH TWO TIMES A DAY    doxycycline (Vibra-Tabs) 100 mg tablet TAKE 1 TABLET 1 HOUR BEFORE APPOINTMENT ,THEN TAKE 1 TABLET ONCE A DAY FOR 2 DAYS ATER PROCEDURE    doxycycline (Vibramycin) 50 mg capsule     fluticasone propion-salmeteroL (Advair Diskus) 100-50 mcg/dose diskus inhaler 1 puff, inhalation, Every 12 hours    gabapentin (Neurontin) 300 mg capsule 1 capsule, oral, Nightly    levothyroxine (LevoxyL) 100 mcg tablet oral    levothyroxine (SYNTHROID, LEVOXYL) 150 mcg, oral, Daily    meloxicam (MOBIC) 15 mg, oral, Daily PRN    metoprolol tartrate (LOPRESSOR) 25 mg, oral, 2 times daily    metroNIDAZOLE (Metrolotion) 0.75 % lotion lotion     multivitamin capsule 1 tablet orally daily    nitroglycerin (Nitrostat) 0.4 mg SL tablet sublingual    omega 3-dha-epa-fish oil (Fish OiL) 1,000 mg (120 mg-180 mg) capsule oral    sildenafil (VIAGRA) 100 mg, oral, As needed    tadalafil (CIALIS) 20 mg, oral, As needed, 1 hour before activity    tadalafil (CIALIS) 5 mg, oral, Daily    traMADol (Ultram) 50 mg tablet take 1 tablet by mouth every 6 to 8 hours if needed for pain        PHYSICAL EXAM:  There were no vitals taken for this visit.  Constitutional: Patient appears well-developed and well-nourished. No distress.    Pulmonary/Chest: Effort normal. No respiratory distress.   Abdominal: Soft, ND NT  : WNL  Musculoskeletal: Normal range of motion.    Neurological: Alert and oriented to person, place, and time.  Psychiatric: Normal mood and affect. Behavior is normal. Thought content normal.      Labs:  No results found for: \"TESTOSTERONE\"  Lab Results   Component Value Date    PSA 3.67 02/16/2024 " "    No components found for: \"CBC\"  Lab Results   Component Value Date    CREATININE 1.13 08/09/2024     No components found for: \"TESTOTMS\"  No results found for: \"TESTF\"    Imaging:    PVR: 137cc    Discussion: Results reviewed with patient. Patient reassured. Denies hematuria, dysuria, nocturia, flank pain, and bothersome frequency or urgency. Still on Cialis 5mg and Sildenafil 100mg. Medication refilled. His PVR is 137cc today. With his fx of prostate CA and slight elevation in PSA since last visit, recommend getting a prostate MRI and follow up with results. If it shows any irregularities, will consider doing a biopsy.Patient in agreement with plan.        Assessment:      1. Benign prostatic hyperplasia, unspecified whether lower urinary tract symptoms present  Measure post void residual      2. Elevated PSA  MR prostate with hermelindo boundaries if pirads 3 or above            Zan Reed is a 68 y.o. male here for FUV     Plan:   1) MRI of prostate and follow up with results    All questions and concerns were addressed. Patient verbalizes understanding and has no other questions at this time.     Scribe Attestation  By signing my name below, IMarcella, Jeniffer   attest that this documentation has been prepared under the direction and in the presence of aHyder John MD.  "

## 2024-08-16 ENCOUNTER — OFFICE VISIT (OUTPATIENT)
Dept: UROLOGY | Facility: HOSPITAL | Age: 69
End: 2024-08-16
Payer: MEDICARE

## 2024-08-16 DIAGNOSIS — N40.0 BENIGN PROSTATIC HYPERPLASIA, UNSPECIFIED WHETHER LOWER URINARY TRACT SYMPTOMS PRESENT: Primary | ICD-10-CM

## 2024-08-16 DIAGNOSIS — R97.20 ELEVATED PSA: ICD-10-CM

## 2024-08-16 PROCEDURE — 99214 OFFICE O/P EST MOD 30 MIN: CPT | Performed by: UROLOGY

## 2024-08-16 PROCEDURE — 51798 US URINE CAPACITY MEASURE: CPT | Performed by: UROLOGY

## 2024-08-16 PROCEDURE — G2211 COMPLEX E/M VISIT ADD ON: HCPCS | Performed by: UROLOGY

## 2024-08-24 ASSESSMENT — ACTIVITIES OF DAILY LIVING (ADL)
GROCERY_SHOPPING: INDEPENDENT
BATHING: INDEPENDENT
TAKING_MEDICATION: INDEPENDENT
MANAGING_FINANCES: INDEPENDENT
DOING_HOUSEWORK: INDEPENDENT
DRESSING: INDEPENDENT

## 2024-08-24 ASSESSMENT — PATIENT HEALTH QUESTIONNAIRE - PHQ9
1. LITTLE INTEREST OR PLEASURE IN DOING THINGS: NOT AT ALL
SUM OF ALL RESPONSES TO PHQ9 QUESTIONS 1 AND 2: 0
2. FEELING DOWN, DEPRESSED OR HOPELESS: NOT AT ALL

## 2024-09-10 ENCOUNTER — HOSPITAL ENCOUNTER (OUTPATIENT)
Dept: RADIOLOGY | Facility: HOSPITAL | Age: 69
Discharge: HOME | End: 2024-09-10
Payer: MEDICARE

## 2024-09-10 DIAGNOSIS — R97.20 ELEVATED PSA: ICD-10-CM

## 2024-09-10 PROCEDURE — 72197 MRI PELVIS W/O & W/DYE: CPT

## 2024-09-10 PROCEDURE — 72197 MRI PELVIS W/O & W/DYE: CPT | Performed by: RADIOLOGY

## 2024-09-10 PROCEDURE — A9575 INJ GADOTERATE MEGLUMI 0.1ML: HCPCS | Performed by: UROLOGY

## 2024-09-10 PROCEDURE — 2550000001 HC RX 255 CONTRASTS: Performed by: UROLOGY

## 2024-09-10 RX ORDER — GADOTERATE MEGLUMINE 376.9 MG/ML
17 INJECTION INTRAVENOUS
Status: COMPLETED | OUTPATIENT
Start: 2024-09-10 | End: 2024-09-10

## 2024-09-17 ENCOUNTER — APPOINTMENT (OUTPATIENT)
Dept: PRIMARY CARE | Facility: CLINIC | Age: 69
End: 2024-09-17
Payer: MEDICARE

## 2024-09-17 VITALS — SYSTOLIC BLOOD PRESSURE: 123 MMHG | DIASTOLIC BLOOD PRESSURE: 74 MMHG

## 2024-09-17 DIAGNOSIS — E78.2 MIXED HYPERLIPIDEMIA: Primary | ICD-10-CM

## 2024-09-17 DIAGNOSIS — R73.02 GLUCOSE INTOLERANCE (IMPAIRED GLUCOSE TOLERANCE): ICD-10-CM

## 2024-09-17 DIAGNOSIS — N40.0 BENIGN PROSTATIC HYPERPLASIA, UNSPECIFIED WHETHER LOWER URINARY TRACT SYMPTOMS PRESENT: ICD-10-CM

## 2024-09-17 PROCEDURE — 99213 OFFICE O/P EST LOW 20 MIN: CPT | Performed by: INTERNAL MEDICINE

## 2024-09-17 NOTE — PROGRESS NOTES
Subjective   Patient ID: Zan Reed is a 68 y.o. male who presents for No chief complaint on file..    HPI mpfft sufs wife is also a pt here  Former dr rashid patient  No cp, sob   No se with medication  No polyuria polydipsia  Some blood work reviewed    Pmhx hyperlipidemia bph glucose intolerance s/p hip and knee replacement    Medication noted and unchanged    Allergy noted and unchanged see emr  Review of Systems    Objective   There were no vitals taken for this visit.    Physical Exam vs noted aox3 ncat  No jvd  Chest ctap  Cv rrr s1s2 without mgr  Ext no cce nl distal pulses    Assessment/Plan  impression hyperlipidemia bph glucose intolerance  Plan good diet good water consumption good exercise weight stability continue with medication follow up with cardiology dr norwood follow up with urology dr agrawal having prostate mri follow up on psa       follow-up for physical and in 3 to 4 months or sooner as needed

## 2024-09-19 ENCOUNTER — OFFICE VISIT (OUTPATIENT)
Dept: UROLOGY | Facility: HOSPITAL | Age: 69
End: 2024-09-19
Payer: MEDICARE

## 2024-09-19 DIAGNOSIS — N13.8 BPH WITH OBSTRUCTION/LOWER URINARY TRACT SYMPTOMS: ICD-10-CM

## 2024-09-19 DIAGNOSIS — N40.1 BPH WITH OBSTRUCTION/LOWER URINARY TRACT SYMPTOMS: ICD-10-CM

## 2024-09-19 PROCEDURE — 99214 OFFICE O/P EST MOD 30 MIN: CPT | Performed by: UROLOGY

## 2024-09-19 PROCEDURE — 51798 US URINE CAPACITY MEASURE: CPT | Performed by: UROLOGY

## 2024-09-19 PROCEDURE — G2211 COMPLEX E/M VISIT ADD ON: HCPCS | Performed by: UROLOGY

## 2024-09-19 NOTE — PROGRESS NOTES
FUV    Last seen - 8/16/24     HISTORY OF PRESENT ILLNESS: Zan Reed is a 68 y.o. male who is being seen today to review MRI results. PSA. FHx of prostate CA (father dx in his 60s).   -on Plavix     PAST MEDICAL HISTORY:  Past Medical History:   Diagnosis Date    Acute myocardial infarction, unspecified (Multi) 02/18/2015    AMI (acute myocardial infarction)    Other conditions influencing health status 04/23/2017    History of cough    Personal history of other specified conditions 05/08/2022    History of elevated prostate specific antigen (PSA)    Sacrococcygeal disorders, not elsewhere classified 05/12/2015    Sacro-iliac pain       PAST SURGICAL HISTORY:  Past Surgical History:   Procedure Laterality Date    CORONARY ANGIOPLASTY WITH STENT PLACEMENT  05/17/2016    Cath Stent Placement    OTHER SURGICAL HISTORY  05/02/2014    Secondary Repair Of Ruptured Achilles Tendon    OTHER SURGICAL HISTORY  05/02/2014    Tenodesis Of Biceps Tendon At Elbow    US GUIDED ASPIRATION OF ABSCESS, HEMATOMA, CYST  8/14/2015    US GUIDED ASPIRATION OF ABSCESS, HEMATOMA, CYST 8/14/2015 [facility]        ALLERGIES:   Allergies   Allergen Reactions    Cat Dander Other     Sneezing, congestion    Cat's Claw Other    Clindamycin Rash    Penicillins Rash and Other     Hasn't happened since he was a little child, thinks rash and swelling were the reactions        MEDICATIONS:   Current Outpatient Medications   Medication Instructions    albuterol 90 mcg/actuation inhaler inhalation, As needed    aspirin (Adult Low Dose Aspirin) 81 mg EC tablet 1 tablet, oral, Daily    atorvastatin (LIPITOR) 80 mg, oral, Daily    calcium carbonate-vitamin D3 500 mg-5 mcg (200 unit) tablet 1 tablet, oral, Daily    cholecalciferol (Vitamin D3) 25 MCG (1000 UT) capsule oral    clopidogrel (PLAVIX) 75 mg, oral, Daily    doxycycline (Vibra-Tabs) 100 mg tablet TAKE 1 TABLET 1 HOUR BEFORE APPOINTMENT ,THEN TAKE 1 TABLET ONCE A DAY FOR 2 DAYS  "ATER PROCEDURE    doxycycline (Vibramycin) 50 mg capsule     fluticasone propion-salmeteroL (Advair Diskus) 100-50 mcg/dose diskus inhaler 1 puff, inhalation, Every 12 hours    gabapentin (Neurontin) 300 mg capsule 1 capsule, oral, Nightly    levothyroxine (LevoxyL) 100 mcg tablet oral    levothyroxine (SYNTHROID, LEVOXYL) 150 mcg, oral, Daily    metoprolol tartrate (LOPRESSOR) 25 mg, oral, 2 times daily    metroNIDAZOLE (Metrolotion) 0.75 % lotion lotion     multivitamin capsule 1 tablet orally daily    nitroglycerin (Nitrostat) 0.4 mg SL tablet sublingual    omega 3-dha-epa-fish oil (Fish OiL) 1,000 mg (120 mg-180 mg) capsule oral    sildenafil (VIAGRA) 100 mg, oral, As needed    tadalafil (CIALIS) 20 mg, oral, As needed, 1 hour before activity    tadalafil (CIALIS) 5 mg, oral, Daily        PHYSICAL EXAM:  There were no vitals taken for this visit.  Constitutional: Patient appears well-developed and well-nourished. No distress.    Pulmonary/Chest: Effort normal. No respiratory distress.   Abdominal: Soft, ND NT  : WNL  Musculoskeletal: Normal range of motion.    Neurological: Alert and oriented to person, place, and time.  Psychiatric: Normal mood and affect. Behavior is normal. Thought content normal.      Labs:  No results found for: \"TESTOSTERONE\"  Lab Results   Component Value Date    PSA 3.67 02/16/2024   PSA trend:  - 4.69 on 8/9/24  - 3.85 on 6/17/23  - 3.9 with 18% on 7/29/22  - 5.61 on 5/7/22  - 2.44 on 8/14/20  No components found for: \"CBC\"  Lab Results   Component Value Date    CREATININE 1.13 08/09/2024     No components found for: \"TESTOTMS\"  No results found for: \"TESTF\"    Imaging:  - MRI of prostate on 9/10/24 demonstrated a PI RADS 4 lesion.  - Results reviewed with patient. Patient reassured.      PVR: 253ml    Discussion:   Doing well, no complaints. MRI results were reviewed with patient. Patient reassured. Denies hematuria, dysuria, nocturia, flank pain, and bothersome frequency or urgency. " Still on Cialis 5mg and Sildenafil 100mg. Based on MRI findings, recommending proceeding with a biopsy, explained. R/b/a's discussed. Risks of bleeding and infection discussed, Patient willing to proceed with biopsy in-office. Patient is on Plavix. Will need to stop 1 week prior to biopsy. Patient verbalizes understanding and would like to proceed.   Assessment:      1. BPH with obstruction/lower urinary tract symptoms  Measure post void residual          Zan Reed is a 68 y.o. male here for FUV     Plan:   1)schedule in-office biopsy. Will stop Plavix 1 week prior to biopsy.   2) All questions and concerns were addressed. Patient verbalizes understanding and has no other questions at this time.    Scribe Attestation  By signing my name below, IMarcella Scribe   attest that this documentation has been prepared under the direction and in the presence of Hayder John MD.

## 2024-09-24 ENCOUNTER — OFFICE VISIT (OUTPATIENT)
Dept: CARDIOLOGY | Facility: HOSPITAL | Age: 69
End: 2024-09-24
Payer: MEDICARE

## 2024-09-24 VITALS
DIASTOLIC BLOOD PRESSURE: 70 MMHG | SYSTOLIC BLOOD PRESSURE: 115 MMHG | BODY MASS INDEX: 27.92 KG/M2 | HEIGHT: 70 IN | WEIGHT: 195 LBS | HEART RATE: 67 BPM

## 2024-09-24 DIAGNOSIS — E78.5 HYPERLIPIDEMIA, UNSPECIFIED HYPERLIPIDEMIA TYPE: Primary | ICD-10-CM

## 2024-09-24 LAB
ATRIAL RATE: 67 BPM
P AXIS: 46 DEGREES
P OFFSET: 182 MS
P ONSET: 124 MS
PR INTERVAL: 198 MS
Q ONSET: 223 MS
QRS COUNT: 11 BEATS
QRS DURATION: 104 MS
QT INTERVAL: 400 MS
QTC CALCULATION(BAZETT): 422 MS
QTC FREDERICIA: 415 MS
R AXIS: 15 DEGREES
T AXIS: 40 DEGREES
T OFFSET: 423 MS
VENTRICULAR RATE: 67 BPM

## 2024-09-24 PROCEDURE — 99214 OFFICE O/P EST MOD 30 MIN: CPT | Performed by: INTERNAL MEDICINE

## 2024-09-24 PROCEDURE — 1159F MED LIST DOCD IN RCRD: CPT | Performed by: INTERNAL MEDICINE

## 2024-09-24 PROCEDURE — 93010 ELECTROCARDIOGRAM REPORT: CPT | Performed by: INTERNAL MEDICINE

## 2024-09-24 PROCEDURE — 93005 ELECTROCARDIOGRAM TRACING: CPT | Performed by: INTERNAL MEDICINE

## 2024-09-24 PROCEDURE — 3008F BODY MASS INDEX DOCD: CPT | Performed by: INTERNAL MEDICINE

## 2024-09-24 PROCEDURE — 1036F TOBACCO NON-USER: CPT | Performed by: INTERNAL MEDICINE

## 2024-09-24 PROCEDURE — 1160F RVW MEDS BY RX/DR IN RCRD: CPT | Performed by: INTERNAL MEDICINE

## 2024-09-24 NOTE — PROGRESS NOTES
Subjective:  Patient returns for a routine annual follow-up.  He is a very pleasant gentleman with known coronary disease as well as hyperlipidemia.  He is status post remote RCA and LAD stenting.    He has generally been clinically stable from a cardiovascular standpoint since his last visit.  He has not had any hospitalizations.  He is due for a prostate biopsy due to some concerning findings on a recent MRI.  He denies any other new health concerns.  His medications remain stable, and he is taking all of these compliantly without side effect.  He has been maintained on DAPT due to multiple coronary stents, and he denies any bleeding problems.  He overall is generally quite happy and comfortable with how he is doing.  He did not need any prescriptions renewed.    Objective:  General: Alert, usual pleasant self.  HEENT: Unchanged.  Lungs: Clear without crackles or wheezing.  Cardiac: Distant heart tones without change.  Abdomen: Nontender.  Extremities: No edema.  Skin: No acute rash.  Neuro: Grossly intact without change.    EKG: Normal sinus rhythm.  Possible septal infarct.  No acute changes.    Lipid panel: Cholesterol-107, HDL-42, LDL-48, TG-85.    Impression/plan:  Zan is generally doing quite nicely at this time.  I did tell him it was fine for him to try to escalate his exercise program as tolerated.  Since he is not have any concerning anginal type symptoms, I did not think we needed to proceed with a repeat ischemic workup at this time, but he knows to alert me should he develop any recurrent, concerning symptoms.    His blood pressure remains in good range, so I did not think we needed to add anything to his metoprolol therapy.    His lipid panel looks excellent on his current high-dose atorvastatin so we will continue this unchanged.    He is fine to proceed with prostate biopsy as planned.  He may hold his Plavix for 1 week before the procedure but needs to remain on aspirin through the procedure to  protect his stents.    Patient instructions:    Continue current medications unchanged.    You may hold your Plavix for 1 week before your prostate biopsy but must remain on aspirin uninterrupted.    Return to clinic in 1 year.    Call for any intercurrent problems.

## 2024-10-11 ENCOUNTER — TELEPHONE (OUTPATIENT)
Dept: PRIMARY CARE | Facility: CLINIC | Age: 69
End: 2024-10-11

## 2024-10-11 DIAGNOSIS — J45.909 UNCOMPLICATED ASTHMA, UNSPECIFIED ASTHMA SEVERITY, UNSPECIFIED WHETHER PERSISTENT (HHS-HCC): ICD-10-CM

## 2024-10-12 RX ORDER — FLUTICASONE PROPIONATE AND SALMETEROL 100; 50 UG/1; UG/1
1 POWDER RESPIRATORY (INHALATION) EVERY 12 HOURS
Qty: 60 EACH | Refills: 3 | Status: SHIPPED | OUTPATIENT
Start: 2024-10-12

## 2024-10-17 ENCOUNTER — OFFICE VISIT (OUTPATIENT)
Dept: ORTHOPEDIC SURGERY | Facility: CLINIC | Age: 69
End: 2024-10-17
Payer: MEDICARE

## 2024-10-17 ENCOUNTER — HOSPITAL ENCOUNTER (OUTPATIENT)
Dept: RADIOLOGY | Facility: CLINIC | Age: 69
Discharge: HOME | End: 2024-10-17
Payer: MEDICARE

## 2024-10-17 DIAGNOSIS — Z96.642 AFTERCARE FOLLOWING LEFT HIP JOINT REPLACEMENT SURGERY: ICD-10-CM

## 2024-10-17 DIAGNOSIS — Z47.1 AFTERCARE FOLLOWING LEFT HIP JOINT REPLACEMENT SURGERY: ICD-10-CM

## 2024-10-17 PROCEDURE — 99214 OFFICE O/P EST MOD 30 MIN: CPT | Performed by: PHYSICIAN ASSISTANT

## 2024-10-17 PROCEDURE — 73502 X-RAY EXAM HIP UNI 2-3 VIEWS: CPT | Mod: LT

## 2024-10-17 PROCEDURE — 1159F MED LIST DOCD IN RCRD: CPT | Performed by: PHYSICIAN ASSISTANT

## 2024-10-17 ASSESSMENT — PAIN - FUNCTIONAL ASSESSMENT: PAIN_FUNCTIONAL_ASSESSMENT: NO/DENIES PAIN

## 2024-10-17 NOTE — PROGRESS NOTES
Liliya Mead, SHIRA, PATorresC, ATC  Adult Reconstruction and Joint Replacement Surgery  Phone: [phone]     Fax:[phone]            Chief Complaint   Patient presents with    Right Hip - Follow-up     1 Year F/U Rt. BLANCA       HPI:  Zan Reed is a pleasant 69 y.o. year-old male here for follow-up of their side: left total hip arthroplasty by Dr. Barriga.  The patient is approximately 1 year(s) postop.The patient has no mechanical symptoms.  The patient has no swelling and pain.   The patients wound has healed uneventfully.  The patient has been doing HEP and/or outpatient PT.  No complications postoperatively.  The patient is very happy with the result of the operation.    Review of Systems  Past Medical History:   Diagnosis Date    Acute myocardial infarction, unspecified (Multi) 02/18/2015    AMI (acute myocardial infarction)    Other conditions influencing health status 04/23/2017    History of cough    Personal history of other specified conditions 05/08/2022    History of elevated prostate specific antigen (PSA)    Sacrococcygeal disorders, not elsewhere classified 05/12/2015    Sacro-iliac pain     Patient Active Problem List   Diagnosis    Abnormality of femur    Acquired spondylolisthesis    Allergic reaction    Arthritis of hand, left, degenerative    Asthma    Baker cyst    Cellulitis of right upper extremity    Olecranon bursitis of right elbow    Chest pain    Chronic pain    Contusion of left forearm    Coronary artery disease    Cough    ED (erectile dysfunction)    Elevated PSA    Eye redness    Fall from ground level    Fatigue    Frequent urination    Glycosuria    H/O joint replacement    Hyperglycemia    Hyperlipidemia    Hypothyroidism    Insomnia    Left lumbar radiculopathy    Lumbar spinal stenosis    Mechanical low back pain    Osteoarthritis of both knees    Osteoarthrosis of knee    Prostatitis    Pain in joint, lower leg    Right ankle pain    Pain and swelling  of left wrist    Right elbow pain    Sacro-iliac pain    Sprain of anterior talofibular ligament of right ankle    Vitamin D deficiency    Hypothyroidism due to Hashimoto's thyroiditis    Mild intermittent asthma without complication (HHS-HCC)    Coronary artery disease involving native heart without angina pectoris    Chronic fatigue    Dermatosis    Arthritis of left hip    Blood glucose elevated    BPH with obstruction/lower urinary tract symptoms    Elevated fasting blood sugar    Incomplete emptying of bladder    Low back pain    Other low back pain    Overweight with body mass index (BMI) of 27 to 27.9 in adult    Traumatic hematoma of forearm, left, initial encounter    Status post total replacement of left hip    Acute myocardial infarction    Bronchitis    Disorder of skin or subcutaneous tissue    Palpitations     Medication Documentation Review Audit       Reviewed by Chantale Larry LPN (Licensed Nurse) on 10/17/24 at 1340      Medication Order Taking? Sig Documenting Provider Last Dose Status   albuterol 90 mcg/actuation inhaler [id number] Yes Inhale if needed. Historical Provider, MD  Active   aspirin (Adult Low Dose Aspirin) 81 mg EC tablet [id number] Yes Take 1 tablet (81 mg) by mouth once daily. Historical Provider, MD  Active   atorvastatin (Lipitor) 80 mg tablet [id number] Yes Take 1 tablet (80 mg) by mouth once daily. Raj Romero MD  Active   calcium carbonate-vitamin D3 500 mg-5 mcg (200 unit) tablet [id number] Yes Take 1 tablet by mouth once daily. Historical Provider, MD  Active   cholecalciferol (Vitamin D3) 25 MCG (1000 UT) capsule [id number] Yes Take by mouth. Historical Provider, MD  Active   clopidogrel (Plavix) 75 mg tablet [id number] Yes Take 1 tablet (75 mg) by mouth once daily. Vi Mullen, APRN-CNP  Active   doxycycline (Vibra-Tabs) 100 mg tablet [id number] Yes TAKE 1 TABLET 1 HOUR BEFORE APPOINTMENT ,THEN TAKE 1 TABLET ONCE A DAY FOR 2 DAYS ATER PROCEDURE Historical Provider, MD  Active      Discontinued 10/12/24 0018   fluticasone propion-salmeteroL (Advair Diskus) 100-50 mcg/dose diskus inhaler [id number] Yes Inhale 1 puff every 12 hours. Jakob Lr MD  Active   gabapentin (Neurontin) 300 mg capsule [id number] Yes Take 1 capsule (300 mg) by mouth once daily at bedtime. Historical Provider, MD  Active   levothyroxine (LevoxyL) 100 mcg tablet [id number] Yes Take by mouth. Historical Provider, MD  Active   metoprolol tartrate (Lopressor) 25 mg tablet [id number] Yes Take 1/2 tablet twice daily Jakob Lr MD  Active   multivitamin capsule [id number] Yes 1 tablet orally daily Historical Provider, MD  Active   nitroglycerin (Nitrostat) 0.4 mg SL tablet [id number] Yes Place under the tongue. Historical Provider, MD  Active   sildenafil (Viagra) 100 mg tablet [id number] Yes Take 1 tablet (100 mg) by mouth if needed for erectile dysfunction. Raj Romero MD  Active   tadalafil (Cialis) 5 mg tablet [id number] Yes Take 1 tablet (5 mg) by mouth once daily. Hayder John MD  Active   tadalafil 20 mg tablet [id number] Yes Take 1 tablet (20 mg) by mouth if needed for erectile dysfunction. 1 hour before activity Raj Romero MD  Active   Wixela Inhub 100-50 mcg/dose diskus inhaler [id number] Yes INHALE 1 PUFF BY MOUTH EVERY 12 HOURS Jakob Lr MD  Active                  Allergies   Allergen Reactions    Cat Dander Other     Sneezing, congestion    Cat's Claw Other    Clindamycin Rash    Penicillins Rash and Other     Hasn't happened since he was a little child, thinks rash and swelling were the reactions     Social History     Socioeconomic History    Marital status:      Spouse name: Not on file    Number of children: Not on file    Years of education: Not on file    Highest education level: Not on file   Occupational History    Not on file   Tobacco Use    Smoking status: Never    Smokeless tobacco: Never   Substance and Sexual Activity    Alcohol use: Yes     Alcohol/week: 3.0 standard  drinks of alcohol     Types: 3 Cans of beer per week     Comment: per week    Drug use: Never    Sexual activity: Not on file   Other Topics Concern    Not on file   Social History Narrative    Not on file     Social Drivers of Health     Financial Resource Strain: Not on file   Food Insecurity: Not on file   Transportation Needs: Not on file   Physical Activity: Not on file   Stress: Not on file   Social Connections: Not on file   Intimate Partner Violence: Not on file   Housing Stability: Not on file     Past Surgical History:   Procedure Laterality Date    CORONARY ANGIOPLASTY WITH STENT PLACEMENT  05/17/2016    Cath Stent Placement    OTHER SURGICAL HISTORY  05/02/2014    Secondary Repair Of Ruptured Achilles Tendon    OTHER SURGICAL HISTORY  05/02/2014    Tenodesis Of Biceps Tendon At Elbow    US GUIDED ASPIRATION OF ABSCESS, HEMATOMA, CYST  8/14/2015    US GUIDED ASPIRATION OF ABSCESS, HEMATOMA, CYST 8/14/2015 [facility]       Physical Exam  side: left Hip  There were no vitals filed for this visit.  AxO x 3 in NAD, appears stated age. Cooperative.   Assistive Device: no device. Coordination and balance intact.  Skin inact over bilateral upper and lower extremities, no erythema, ecchymosis, temperature changes. No popliteal lymphadenopathy,  No other overlying lesion  mood: euthymic  Respirations non labored  Surgical hip demonstrates pain free ROM with mild tenderness to palpation over greater trochanter laterally.  The incision is midline healed with no signs of surrounding infection, dehiscence or drainage.   Neurovascularly back to baseline  5/5 hip flexion/knee extension/DF/PF/EHL  SILT in savanna/saph/ per/tib distribution   Extremities warm and well perfused.  No lower extremity calf tenderness or swelling  2+ Femoral/DP/PT pulses bilaterally    Imaging:    I personally reviewed multiple views of the hip today in clinic.  Status post side: left Total Hip Arthroplasty. The implant is well fixed,  well aligned.  No evidence of davy-implant fracture, lucency or dislocation. Leg lengths closely restored.    Impression/Plan:  Zan Reed is doing well post-operatively and happy with the results of the operation.     S/P side: left Total hip Arthroplasty  I talked with patient at length about activity precautions and progression of activities. The patient understands their permanent precautions.   3. Discussed the importance of dental prophylactic dental antibiotics lifelong. Patient may request medication refill through MyChart,       Pharmacy or surgeons office.    All questions answered.    Follow-up 5 years with x-rays at next visit.    SHIRA Silveira, PA-C, ATC  Orthopedic Physician Assisant  Adult Reconstruction and Total Joint Replacement  General Orthopedics  Department of Orthopaedic Surgery  [place] [id number]  People[employer] messaging preferred

## 2024-10-24 ENCOUNTER — APPOINTMENT (OUTPATIENT)
Dept: RADIOLOGY | Facility: CLINIC | Age: 69
End: 2024-10-24
Payer: MEDICARE

## 2024-10-24 RX ORDER — LEVOFLOXACIN 750 MG/1
750 TABLET ORAL ONCE
Status: COMPLETED | OUTPATIENT
Start: 2024-10-25 | End: 2024-10-25

## 2024-10-25 ENCOUNTER — PROCEDURE VISIT (OUTPATIENT)
Dept: UROLOGY | Facility: HOSPITAL | Age: 69
End: 2024-10-25
Payer: MEDICARE

## 2024-10-25 DIAGNOSIS — Z79.2 PROPHYLACTIC ANTIBIOTIC: ICD-10-CM

## 2024-10-25 DIAGNOSIS — R97.20 ELEVATED PSA: ICD-10-CM

## 2024-10-25 PROCEDURE — 76942 ECHO GUIDE FOR BIOPSY: CPT | Performed by: UROLOGY

## 2024-10-25 PROCEDURE — 55700 HC BIOPSY OF PROSTATE,NEEDLE/PUNCH: CPT | Performed by: UROLOGY

## 2024-10-25 PROCEDURE — 76872 US TRANSRECTAL: CPT | Performed by: UROLOGY

## 2024-10-25 PROCEDURE — 55700 PR PROSTATE NEEDLE BIOPSY ANY APPROACH: CPT | Performed by: UROLOGY

## 2024-10-25 PROCEDURE — 2500000001 HC RX 250 WO HCPCS SELF ADMINISTERED DRUGS (ALT 637 FOR MEDICARE OP): Performed by: UROLOGY

## 2024-10-25 NOTE — PROGRESS NOTES
FUV    Last seen - 9/19/24     HISTORY OF PRESENT ILLNESS: Zan Reed is a 68 y.o. male who is being seen today for prostate biopsy. Elevated PSA. FHx of prostate CA (father dx in his 60s).   -on Plavix     PAST MEDICAL HISTORY:  Past Medical History:   Diagnosis Date    Acute myocardial infarction, unspecified (Multi) 02/18/2015    AMI (acute myocardial infarction)    Other conditions influencing health status 04/23/2017    History of cough    Personal history of other specified conditions 05/08/2022    History of elevated prostate specific antigen (PSA)    Sacrococcygeal disorders, not elsewhere classified 05/12/2015    Sacro-iliac pain       PAST SURGICAL HISTORY:  Past Surgical History:   Procedure Laterality Date    CORONARY ANGIOPLASTY WITH STENT PLACEMENT  05/17/2016    Cath Stent Placement    OTHER SURGICAL HISTORY  05/02/2014    Secondary Repair Of Ruptured Achilles Tendon    OTHER SURGICAL HISTORY  05/02/2014    Tenodesis Of Biceps Tendon At Elbow    US GUIDED ASPIRATION OF ABSCESS, HEMATOMA, CYST  8/14/2015    US GUIDED ASPIRATION OF ABSCESS, HEMATOMA, CYST 8/14/2015 [facility]        ALLERGIES:   Allergies   Allergen Reactions    Cat Dander Other     Sneezing, congestion    Cat's Claw Other    Clindamycin Rash    Penicillins Rash and Other     Hasn't happened since he was a little child, thinks rash and swelling were the reactions        MEDICATIONS:   Current Outpatient Medications   Medication Instructions    albuterol 90 mcg/actuation inhaler As needed    aspirin (Adult Low Dose Aspirin) 81 mg EC tablet 1 tablet, Daily    atorvastatin (LIPITOR) 80 mg, oral, Daily    calcium carbonate-vitamin D3 500 mg-5 mcg (200 unit) tablet 1 tablet, Daily    cholecalciferol (Vitamin D3) 25 MCG (1000 UT) capsule Take by mouth.    clopidogrel (PLAVIX) 75 mg, oral, Daily    doxycycline (Vibra-Tabs) 100 mg tablet TAKE 1 TABLET 1 HOUR BEFORE APPOINTMENT ,THEN TAKE 1 TABLET ONCE A DAY FOR 2 DAYS ATER  "PROCEDURE    fluticasone propion-salmeteroL (Advair Diskus) 100-50 mcg/dose diskus inhaler 1 puff, inhalation, Every 12 hours    gabapentin (Neurontin) 300 mg capsule 1 capsule, Nightly    levothyroxine (LevoxyL) 100 mcg tablet Take by mouth.    metoprolol tartrate (Lopressor) 25 mg tablet Take 1/2 tablet twice daily    multivitamin capsule 1 tablet orally daily    nitroglycerin (Nitrostat) 0.4 mg SL tablet Place under the tongue.    sildenafil (VIAGRA) 100 mg, oral, As needed    tadalafil (CIALIS) 20 mg, oral, As needed, 1 hour before activity    tadalafil (CIALIS) 5 mg, oral, Daily    Wixela Inhub 100-50 mcg/dose diskus inhaler 1 puff, inhalation, Every 12 hours        PHYSICAL EXAM:  There were no vitals taken for this visit.  Constitutional: Patient appears well-developed and well-nourished. No distress.    Pulmonary/Chest: Effort normal. No respiratory distress.   Abdominal: Soft, ND NT  : WNL  Musculoskeletal: Normal range of motion.    Neurological: Alert and oriented to person, place, and time.  Psychiatric: Normal mood and affect. Behavior is normal. Thought content normal.      Labs:  No results found for: \"TESTOSTERONE\"  Lab Results   Component Value Date    PSA 3.67 02/16/2024   PSA trend:  - 4.69 on 8/9/24  - 3.85 on 6/17/23  - 3.9 with 18% on 7/29/22  - 5.61 on 5/7/22  - 2.44 on 8/14/20  No components found for: \"CBC\"  Lab Results   Component Value Date    CREATININE 1.13 08/09/2024     No components found for: \"TESTOTMS\"  No results found for: \"TESTF\"    Imaging:  - MRI of prostate on 9/10/24 demonstrated a PI RADS 4 lesion.  - Results reviewed with patient. Patient reassured.      PVR: 253ml    Discussion:   Fusion Biopsy with TRUS of ROIs with standard biopsy was completed today without complications and he tolerated the procedure well. Transrectal diagnostic ultrasound guidance for needle biopsy of the prostate was used. Specimen obtained from 1 WILSON's, Right Base, Right Mid, Right [place], Left " Base, Left Mid and Left [place]. Post-biopsy care instructions were provided to patient. Will follow up in 2 weeks to discuss biopsy results.    Assessment:      1. Elevated PSA  Surgical Pathology Exam      2. Prophylactic antibiotic  levoFLOXacin (Levaquin) tablet 750 mg          Zan Reed is a 69 y.o. male here for FUV     Plan:   1)follow up in 2 weeks to discuss biopsy results   2) All questions and concerns were addressed. Patient verbalizes understanding and has no other questions at this time.    Scribe Attestation  By signing my name below, IMarcella Scribe   attest that this documentation has been prepared under the direction and in the presence of Hayder John MD.

## 2024-11-05 ENCOUNTER — HOSPITAL ENCOUNTER (OUTPATIENT)
Dept: RADIOLOGY | Facility: HOSPITAL | Age: 69
Discharge: HOME | End: 2024-11-05
Payer: MEDICARE

## 2024-11-05 ENCOUNTER — OFFICE VISIT (OUTPATIENT)
Dept: ORTHOPEDIC SURGERY | Facility: HOSPITAL | Age: 69
End: 2024-11-05
Payer: MEDICARE

## 2024-11-05 VITALS — WEIGHT: 190 LBS | HEIGHT: 71 IN | BODY MASS INDEX: 26.6 KG/M2

## 2024-11-05 DIAGNOSIS — M79.651 RIGHT THIGH PAIN: ICD-10-CM

## 2024-11-05 DIAGNOSIS — S76.111A QUADRICEPS STRAIN, RIGHT, INITIAL ENCOUNTER: Primary | ICD-10-CM

## 2024-11-05 DIAGNOSIS — S70.10XA QUADRICEPS CONTUSION: ICD-10-CM

## 2024-11-05 PROCEDURE — 1159F MED LIST DOCD IN RCRD: CPT | Performed by: SPECIALIST/TECHNOLOGIST

## 2024-11-05 PROCEDURE — 1160F RVW MEDS BY RX/DR IN RCRD: CPT | Performed by: SPECIALIST/TECHNOLOGIST

## 2024-11-05 PROCEDURE — 3008F BODY MASS INDEX DOCD: CPT | Performed by: SPECIALIST/TECHNOLOGIST

## 2024-11-05 PROCEDURE — 73552 X-RAY EXAM OF FEMUR 2/>: CPT | Mod: RIGHT SIDE | Performed by: RADIOLOGY

## 2024-11-05 PROCEDURE — 73552 X-RAY EXAM OF FEMUR 2/>: CPT | Mod: RT

## 2024-11-05 PROCEDURE — 99213 OFFICE O/P EST LOW 20 MIN: CPT | Performed by: SPECIALIST/TECHNOLOGIST

## 2024-11-05 PROCEDURE — 1036F TOBACCO NON-USER: CPT | Performed by: SPECIALIST/TECHNOLOGIST

## 2024-11-05 PROCEDURE — 1125F AMNT PAIN NOTED PAIN PRSNT: CPT | Performed by: SPECIALIST/TECHNOLOGIST

## 2024-11-05 ASSESSMENT — PAIN SCALES - GENERAL: PAINLEVEL_OUTOF10: 3

## 2024-11-05 ASSESSMENT — PAIN - FUNCTIONAL ASSESSMENT: PAIN_FUNCTIONAL_ASSESSMENT: 0-10

## 2024-11-06 LAB
LAB AP ASR DISCLAIMER: NORMAL
LAB AP BLOCK FOR ADDITIONAL STUDIES: NORMAL
LABORATORY COMMENT REPORT: NORMAL
PATH REPORT.FINAL DX SPEC: NORMAL
PATH REPORT.GROSS SPEC: NORMAL
PATH REPORT.RELEVANT HX SPEC: NORMAL
PATH REPORT.TOTAL CANCER: NORMAL

## 2024-11-06 NOTE — PROGRESS NOTES
Chief Complaint: Pain of the Right Thigh       HPI:  Zan Reed is a 69 y.o. male presenting to the orthopedic walk-in clinic with right thigh pain occurring approximately 1 week ago while raking leaves.  He states he does not recall a specific mechanism of injury and does not recall hitting his leg while raking.  He did have to put the leaves in a container and will them to the road and lift the container to dump the leaves out.  Over the weekend he was in [place] where he had a lot of walking.  Today he reports a generalized tenderness over the distal anterior thigh that worsens with sleeping and after activities.  He has been using Aleve and Zanaflex for symptomatic relief.  He has a prior right total knee arthroplasty performed by Dr. Garcia approximately 6 years ago.  Denies numbness or tingling in the right lower extremity.  Presents for treatment recommendations.    Objective     ROS:  Constitutional: No fever, no chills, not feeling tired, no recent weight gain and no recent weight loss  ENT: No nosebleeds  Cardiovascular: No chest pain  Respiratory: No shortness of breath and no cough  Gastrointestinal: No abdominal pain, no nausea, no diarrhea, and no vomiting  Musculoskeletal: Positive for right thigh pain  Integumentary: No rashes and no skin lesions  Neurological: No headache  Psychiatric: No sleep disturbances no depression  Endocrine: No muscle weakness and no muscle cramps  Hematologic/lymphatic: No swelling glands and no tendency for easy bruising    Past Medical History:   Diagnosis Date    Acute myocardial infarction, unspecified 02/18/2015    AMI (acute myocardial infarction)    Other conditions influencing health status 04/23/2017    History of cough    Personal history of other specified conditions 05/08/2022    History of elevated prostate specific antigen (PSA)    Sacrococcygeal disorders, not elsewhere classified 05/12/2015    Sacro-iliac pain        Past Surgical History:    Procedure Laterality Date    CORONARY ANGIOPLASTY WITH STENT PLACEMENT  05/17/2016    Cath Stent Placement    OTHER SURGICAL HISTORY  05/02/2014    Secondary Repair Of Ruptured Achilles Tendon    OTHER SURGICAL HISTORY  05/02/2014    Tenodesis Of Biceps Tendon At Elbow    US GUIDED ASPIRATION OF ABSCESS, HEMATOMA, CYST  8/14/2015    US GUIDED ASPIRATION OF ABSCESS, HEMATOMA, CYST 8/14/2015 [facility] ANCILLARY LEGACY        Social Connections: Not on file          Physical Exam:  General appearance: WN, WD male, in no acute distress  Skin: Ecchymosis seen throughout the distal anterior and medial thigh, superior knee.  No rashes, lesions or wounds  Head: Normocephalic, no evidence of trauma  Eye: EOMI, conjunctiva clear, no discharge  ENT: Nares patent  Neck: No abnormal contour, tracheal midline  Chest/lungs: No respiratory distress, speaking in complete sentences  Musculoskeletal: Tender to palpation over the vastus lateralis muscle.  Quadriceps tendon intact.  He is able to perform isometric quadriceps contraction and straight leg raise without difficulty or lag.  Knee flexion to 125 degrees bilaterally.  No joint effusion.  5/5 manual muscle testing knee flexion and extension bilaterally.  No decreased ROM, muscle wasting, rigidity    Neurological: A&O x3, no focal deficits, intact bilateral LE  Psych: normal affect, mood, appearance      Image Results:  X-rays taken on 11/5/2024 reviewed with the patient in the office today show no acute fractures or dislocations.      Assessment/Plan   Encounter Diagnoses:  Quadriceps strain, right, initial encounter    Right thigh pain    Quadriceps contusion    Orders Placed This Encounter    XR femur right 2+ views    Referral to Physical Therapy       The patient and I discussed his clinical presentation and physical exam findings consistent with a right quadricep strain versus contusion.  We agreed upon initiating conservative treatment.  He does state that he is on Plavix  currently and due to that he is unable to take anti-inflammatory medications by mouth.  This can explain some of the ecchymosis seen on physical examination today.  I encouraged him to obtain a thigh sleeve and wear that while ambulatory.  I encouraged him to ice his thigh 15 to 20 minutes at a time 2-3 times per day and I encouraged him to do this on stretch.  1 thing that we need to be aware of and to help prevent is heterotopic ossification secondary to bleeding in the thigh.  We agreed upon a referral to physical therapy to focus on edema control, pain control and soft tissue modalities and home exercise regimen.  He can take two 500 mg extra strength Tylenol up to 3 times a day as needed for pain.  He will follow-up on an as-needed basis.  His questions were answered.  He is in agreement the plan.    ** This office note was dictated using Dragon voice to text software and was not proofread for spelling or grammatical errors **

## 2024-11-11 ENCOUNTER — TELEPHONE (OUTPATIENT)
Dept: PRIMARY CARE | Facility: CLINIC | Age: 69
End: 2024-11-11

## 2024-11-11 DIAGNOSIS — N52.9 ERECTILE DYSFUNCTION, UNSPECIFIED ERECTILE DYSFUNCTION TYPE: ICD-10-CM

## 2024-11-11 DIAGNOSIS — I25.10 CORONARY ARTERY DISEASE INVOLVING NATIVE HEART WITHOUT ANGINA PECTORIS, UNSPECIFIED VESSEL OR LESION TYPE: ICD-10-CM

## 2024-11-11 DIAGNOSIS — I10 BENIGN ESSENTIAL HYPERTENSION: ICD-10-CM

## 2024-11-11 DIAGNOSIS — E78.5 HYPERLIPIDEMIA, UNSPECIFIED HYPERLIPIDEMIA TYPE: ICD-10-CM

## 2024-11-11 DIAGNOSIS — N40.1 BPH WITH OBSTRUCTION/LOWER URINARY TRACT SYMPTOMS: ICD-10-CM

## 2024-11-11 DIAGNOSIS — N13.8 BPH WITH OBSTRUCTION/LOWER URINARY TRACT SYMPTOMS: ICD-10-CM

## 2024-11-15 ENCOUNTER — APPOINTMENT (OUTPATIENT)
Dept: UROLOGY | Facility: HOSPITAL | Age: 69
End: 2024-11-15
Payer: MEDICARE

## 2024-11-16 RX ORDER — TADALAFIL 5 MG/1
5 TABLET ORAL DAILY
Qty: 90 TABLET | Refills: 3 | OUTPATIENT
Start: 2024-11-16

## 2024-11-16 RX ORDER — SILDENAFIL 100 MG/1
100 TABLET, FILM COATED ORAL AS NEEDED
Qty: 30 TABLET | Refills: 0 | Status: SHIPPED | OUTPATIENT
Start: 2024-11-16 | End: 2025-02-14

## 2024-11-16 RX ORDER — METOPROLOL TARTRATE 25 MG/1
TABLET, FILM COATED ORAL
Qty: 30 TABLET | Refills: 1 | Status: SHIPPED | OUTPATIENT
Start: 2024-11-16

## 2024-11-16 RX ORDER — CLOPIDOGREL BISULFATE 75 MG/1
75 TABLET ORAL DAILY
Qty: 90 TABLET | Refills: 1 | Status: SHIPPED | OUTPATIENT
Start: 2024-11-16

## 2024-11-16 RX ORDER — ATORVASTATIN CALCIUM 80 MG/1
80 TABLET, FILM COATED ORAL DAILY
Qty: 90 TABLET | Refills: 1 | Status: SHIPPED | OUTPATIENT
Start: 2024-11-16 | End: 2025-11-16

## 2024-11-22 ENCOUNTER — APPOINTMENT (OUTPATIENT)
Dept: UROLOGY | Facility: HOSPITAL | Age: 69
End: 2024-11-22
Payer: MEDICARE

## 2024-11-22 VITALS — HEART RATE: 66 BPM | SYSTOLIC BLOOD PRESSURE: 135 MMHG | DIASTOLIC BLOOD PRESSURE: 77 MMHG

## 2024-11-22 DIAGNOSIS — R97.20 ELEVATED PSA: ICD-10-CM

## 2024-11-22 DIAGNOSIS — R39.9 URINARY SYMPTOM OR SIGN: ICD-10-CM

## 2024-11-22 DIAGNOSIS — N13.8 BPH WITH OBSTRUCTION/LOWER URINARY TRACT SYMPTOMS: Primary | ICD-10-CM

## 2024-11-22 DIAGNOSIS — N40.1 BPH WITH OBSTRUCTION/LOWER URINARY TRACT SYMPTOMS: Primary | ICD-10-CM

## 2024-11-22 PROCEDURE — 99214 OFFICE O/P EST MOD 30 MIN: CPT | Performed by: UROLOGY

## 2024-11-22 PROCEDURE — 1126F AMNT PAIN NOTED NONE PRSNT: CPT | Performed by: UROLOGY

## 2024-11-22 PROCEDURE — 1159F MED LIST DOCD IN RCRD: CPT | Performed by: UROLOGY

## 2024-11-22 PROCEDURE — G2211 COMPLEX E/M VISIT ADD ON: HCPCS | Performed by: UROLOGY

## 2024-11-22 ASSESSMENT — PAIN SCALES - GENERAL: PAINLEVEL_OUTOF10: 0-NO PAIN

## 2024-11-22 NOTE — PROGRESS NOTES
FUV    Last seen - 10/25/24     HISTORY OF PRESENT ILLNESS: Zan Reed is a 68 y.o. male who is being seen today for prostate biopsy results  Elevated PSA. FHx of prostate CA (father dx in his 60s).   -on Plavix     PAST MEDICAL HISTORY:  Past Medical History:   Diagnosis Date    Acute myocardial infarction, unspecified 02/18/2015    AMI (acute myocardial infarction)    Other conditions influencing health status 04/23/2017    History of cough    Personal history of other specified conditions 05/08/2022    History of elevated prostate specific antigen (PSA)    Sacrococcygeal disorders, not elsewhere classified 05/12/2015    Sacro-iliac pain       PAST SURGICAL HISTORY:  Past Surgical History:   Procedure Laterality Date    CORONARY ANGIOPLASTY WITH STENT PLACEMENT  05/17/2016    Cath Stent Placement    OTHER SURGICAL HISTORY  05/02/2014    Secondary Repair Of Ruptured Achilles Tendon    OTHER SURGICAL HISTORY  05/02/2014    Tenodesis Of Biceps Tendon At Elbow    US GUIDED ASPIRATION OF ABSCESS, HEMATOMA, CYST  8/14/2015    US GUIDED ASPIRATION OF ABSCESS, HEMATOMA, CYST 8/14/2015 [facility]LL[facility]        ALLERGIES:   Allergies   Allergen Reactions    Cat Dander Other     Sneezing, congestion    Cat's Claw Other    Clindamycin Rash    Penicillins Rash and Other     Hasn't happened since he was a little child, thinks rash and swelling were the reactions        MEDICATIONS:   Current Outpatient Medications   Medication Instructions    albuterol 90 mcg/actuation inhaler As needed    aspirin (Adult Low Dose Aspirin) 81 mg EC tablet 1 tablet, Daily    atorvastatin (LIPITOR) 80 mg, oral, Daily    calcium carbonate-vitamin D3 500 mg-5 mcg (200 unit) tablet 1 tablet, Daily    cholecalciferol (Vitamin D3) 25 MCG (1000 UT) capsule Take by mouth.    clopidogrel (PLAVIX) 75 mg, oral, Daily    doxycycline (Vibra-Tabs) 100 mg tablet TAKE 1 TABLET 1 HOUR BEFORE APPOINTMENT ,THEN TAKE 1 TABLET ONCE A DAY FOR 2 DAYS ATER  "PROCEDURE    fluticasone propion-salmeteroL (Advair Diskus) 100-50 mcg/dose diskus inhaler 1 puff, inhalation, Every 12 hours    gabapentin (Neurontin) 300 mg capsule 1 capsule, Nightly    levothyroxine (LevoxyL) 100 mcg tablet Take by mouth.    metoprolol tartrate (Lopressor) 25 mg tablet Take 1/2 tablet twice daily    multivitamin capsule 1 tablet orally daily    nitroglycerin (Nitrostat) 0.4 mg SL tablet Place under the tongue.    sildenafil (VIAGRA) 100 mg, oral, As needed    tadalafil (CIALIS) 20 mg, oral, As needed, 1 hour before activity    tadalafil (CIALIS) 5 mg, oral, Daily    Wixela Inhub 100-50 mcg/dose diskus inhaler 1 puff, inhalation, Every 12 hours        PHYSICAL EXAM:  Blood pressure 135/77, pulse 66.  Constitutional: Patient appears well-developed and well-nourished. No distress.    Pulmonary/Chest: Effort normal. No respiratory distress.   Abdominal: Soft, ND NT  : WNL  Musculoskeletal: Normal range of motion.    Neurological: Alert and oriented to person, place, and time.  Psychiatric: Normal mood and affect. Behavior is normal. Thought content normal.      Labs:  No results found for: \"TESTOSTERONE\"  Lab Results   Component Value Date    PSA 3.67 02/16/2024   PSA trend:  - 4.69 on 8/9/24  - 3.85 on 6/17/23  - 3.9 with 18% on 7/29/22  - 5.61 on 5/7/22  - 2.44 on 8/14/20  No components found for: \"CBC\"  Lab Results   Component Value Date    CREATININE 1.13 08/09/2024     No components found for: \"TESTOTMS\"  No results found for: \"TESTF\"    Imaging:  - MRI of prostate on 9/10/24 demonstrated a PI RADS 4 lesion.    Surgical pathology 10/25/24: Prostatic adenocarcinoma, acinar type, Matthews score 7 (3+4), grade group 2, involving 2 of 6 cores and approximately 10% of submitted tissue   - Percentage of Matthews pattern 4: 5%  Positive for perineural invasion  High-grade prostatic intraepithelial neoplasia present    Discussion:   Pathology reviewed with patient. I had a long discussion with him and " his family regarding the different treatment options of radiation and surgery. We also discussed the surgical option of robotic radical prostatectomy with bilateral pelvic lymph node dissection. We reviewed the role of active surveillance in detail. I discussed all the different risks, benefits, and alternatives of each one of these in detail, and I answered their questions to their satisfaction. I also gave him some literature to review. I have offered an appointment for him to meet with one of our Radiation Oncologists to discuss the radiation options in more detail and I will be available at any time for answer any further questions. I will see him back after he has had a chance to review these options in more detail to help answer any questions along the way. We will wait for him to decide on how he would like to be treated.     Assessment:      1. BPH with obstruction/lower urinary tract symptoms  Measure post void residual      2. Urinary symptom or sign        3. Elevated PSA  CANCELED: Measure post void residual          Zan Reed is a 69 y.o. male here for FUV     Plan:   1) Follow up in 2-3 weeks or as needed to discuss about his management plan  2) All questions and concerns were addressed. Patient verbalizes understanding and has no other questions at this time.    Scribe Attestation  By signing my name below, I, Jeniffer Mccartney   attest that this documentation has been prepared under the direction and in the presence of Hayder John MD.

## 2024-12-12 NOTE — PROGRESS NOTES
FUV    Last seen - 11/22/24     HISTORY OF PRESENT ILLNESS: Zan Reed is a 68 y.o. male who is being seen today for prostate cancer treatment options   Elevated PSA. FHx of prostate CA (father dx in his 60s).   -on Plavix     PAST MEDICAL HISTORY:  Past Medical History:   Diagnosis Date    Acute myocardial infarction, unspecified 02/18/2015    AMI (acute myocardial infarction)    Other conditions influencing health status 04/23/2017    History of cough    Personal history of other specified conditions 05/08/2022    History of elevated prostate specific antigen (PSA)    Sacrococcygeal disorders, not elsewhere classified 05/12/2015    Sacro-iliac pain       PAST SURGICAL HISTORY:  Past Surgical History:   Procedure Laterality Date    CORONARY ANGIOPLASTY WITH STENT PLACEMENT  05/17/2016    Cath Stent Placement    OTHER SURGICAL HISTORY  05/02/2014    Secondary Repair Of Ruptured Achilles Tendon    OTHER SURGICAL HISTORY  05/02/2014    Tenodesis Of Biceps Tendon At Elbow    US GUIDED ASPIRATION OF ABSCESS, HEMATOMA, CYST  8/14/2015    US GUIDED ASPIRATION OF ABSCESS, HEMATOMA, CYST 8/14/2015 [facility]CILLARY LEGACY        ALLERGIES:   Allergies   Allergen Reactions    Cat Dander Other     Sneezing, congestion    Cat's Claw Other    Clindamycin Rash    Penicillins Rash and Other     Hasn't happened since he was a little child, thinks rash and swelling were the reactions        MEDICATIONS:   Current Outpatient Medications   Medication Instructions    albuterol 90 mcg/actuation inhaler As needed    aspirin (Adult Low Dose Aspirin) 81 mg EC tablet 1 tablet, Daily    atorvastatin (LIPITOR) 80 mg, oral, Daily    calcium carbonate-vitamin D3 500 mg-5 mcg (200 unit) tablet 1 tablet, Daily    cholecalciferol (Vitamin D3) 25 MCG (1000 UT) capsule Take by mouth.    clopidogrel (PLAVIX) 75 mg, oral, Daily    doxycycline (Vibra-Tabs) 100 mg tablet TAKE 1 TABLET 1 HOUR BEFORE APPOINTMENT ,THEN TAKE 1 TABLET ONCE A DAY FOR 2  "DAYS ATER PROCEDURE    fluticasone propion-salmeteroL (Advair Diskus) 100-50 mcg/dose diskus inhaler 1 puff, inhalation, Every 12 hours    fluticasone propion-salmeteroL (Wixela Inhub) 100-50 mcg/dose diskus inhaler 1 puff, inhalation, Every 12 hours    gabapentin (Neurontin) 300 mg capsule 1 capsule, Nightly    levothyroxine (LevoxyL) 100 mcg tablet Take by mouth.    metoprolol tartrate (Lopressor) 25 mg tablet Take 1/2 tablet twice daily    multivitamin capsule 1 tablet orally daily    nitroglycerin (Nitrostat) 0.4 mg SL tablet Place under the tongue.    sildenafil (VIAGRA) 100 mg, oral, As needed    tadalafil (CIALIS) 20 mg, oral, As needed, 1 hour before activity    tadalafil (CIALIS) 5 mg, oral, Daily        PHYSICAL EXAM:  Blood pressure 128/78.  Constitutional: Patient appears well-developed and well-nourished. No distress.    Pulmonary/Chest: Effort normal. No respiratory distress.   Abdominal: Soft, ND NT  : WNL  Musculoskeletal: Normal range of motion.    Neurological: Alert and oriented to person, place, and time.  Psychiatric: Normal mood and affect. Behavior is normal. Thought content normal.      Labs:  No results found for: \"TESTOSTERONE\"  Lab Results   Component Value Date    PSA 3.67 02/16/2024   PSA trend:  - 4.69 on 8/9/24  - 3.85 on 6/17/23  - 3.9 with 18% on 7/29/22  - 5.61 on 5/7/22  - 2.44 on 8/14/20  No components found for: \"CBC\"  Lab Results   Component Value Date    CREATININE 1.13 08/09/2024     No components found for: \"TESTOTMS\"  No results found for: \"TESTF\"    Imaging:  - MRI of prostate on 9/10/24 demonstrated a PI RADS 4 lesion.    Surgical pathology 10/25/24: Prostatic adenocarcinoma, acinar type, Heidy score 7 (3+4), grade group 2, involving 2 of 6 cores and approximately 10% of submitted tissue   - Percentage of Heidy pattern 4: 5%  Positive for perineural invasion  High-grade prostatic intraepithelial neoplasia present    Discussion:   Pathology reviewed with patient. I " had a long discussion with him and his family regarding the different treatment options of radiation and surgery. We also discussed the surgical option of robotic radical prostatectomy with bilateral pelvic lymph node dissection. We reviewed the role of active surveillance in detail. I discussed all the different risks, benefits, and alternatives of each one of these in detail, and I answered their questions to their satisfaction. I also gave him some literature to review. I have offered an appointment for him to meet with one of our Radiation Oncologists to discuss the radiation options in more detail and I will be available at any time for answer any further questions. He is interested in radiation treatment. He is weary of surgery. He does not have food sexual function currently and I explained we could do a referral to a partner for this if he wishes.  Discussed risks and side effects of radiation in detail with pt and will refer him for this. Discussed option of a decipher test, will proceed with this as well. [facility] website was recommended for him to review.     AUA 6  AMI 10     Assessment:      1. Prostate cancer (Multi)  Referral to Radiation Oncology    PSA            Zan Reed is a 69 y.o. male here for FUV     Plan:   1) Referral to radiation oncologist, Dr. Duarte   2) Decipher test ordered  3) Follow up in 3 months wit PSA prior   All questions and concerns were addressed. Patient verbalizes understanding and has no other questions at this time.    Scribe Attestation  By signing my name below, ICandi Scribe   attest that this documentation has been prepared under the direction and in the presence of Hayder John MD.

## 2024-12-13 ENCOUNTER — TELEPHONE (OUTPATIENT)
Dept: UROLOGY | Facility: HOSPITAL | Age: 69
End: 2024-12-13

## 2024-12-13 ENCOUNTER — OFFICE VISIT (OUTPATIENT)
Dept: UROLOGY | Facility: HOSPITAL | Age: 69
End: 2024-12-13
Payer: MEDICARE

## 2024-12-13 VITALS — SYSTOLIC BLOOD PRESSURE: 128 MMHG | DIASTOLIC BLOOD PRESSURE: 78 MMHG

## 2024-12-13 DIAGNOSIS — C61 PROSTATE CANCER (MULTI): ICD-10-CM

## 2024-12-13 PROCEDURE — 99214 OFFICE O/P EST MOD 30 MIN: CPT | Performed by: UROLOGY

## 2024-12-13 PROCEDURE — 1036F TOBACCO NON-USER: CPT | Performed by: UROLOGY

## 2024-12-13 PROCEDURE — G2211 COMPLEX E/M VISIT ADD ON: HCPCS | Performed by: UROLOGY

## 2024-12-13 PROCEDURE — 1159F MED LIST DOCD IN RCRD: CPT | Performed by: UROLOGY

## 2024-12-13 ASSESSMENT — PATIENT HEALTH QUESTIONNAIRE - PHQ9
SUM OF ALL RESPONSES TO PHQ9 QUESTIONS 1 AND 2: 0
1. LITTLE INTEREST OR PLEASURE IN DOING THINGS: NOT AT ALL
2. FEELING DOWN, DEPRESSED OR HOPELESS: NOT AT ALL

## 2024-12-23 LAB
AP SUMMARY REPORT: NORMAL
SCAN RESULT: NORMAL

## 2024-12-26 ENCOUNTER — APPOINTMENT (OUTPATIENT)
Dept: UROLOGY | Facility: HOSPITAL | Age: 69
End: 2024-12-26
Payer: COMMERCIAL

## 2024-12-31 ENCOUNTER — PREP FOR PROCEDURE (OUTPATIENT)
Dept: UROLOGY | Facility: HOSPITAL | Age: 69
End: 2024-12-31

## 2024-12-31 ENCOUNTER — HOSPITAL ENCOUNTER (OUTPATIENT)
Dept: RADIATION ONCOLOGY | Facility: HOSPITAL | Age: 69
Setting detail: RADIATION/ONCOLOGY SERIES
Discharge: HOME | End: 2024-12-31
Payer: COMMERCIAL

## 2024-12-31 DIAGNOSIS — C61 PROSTATE CANCER (MULTI): ICD-10-CM

## 2024-12-31 DIAGNOSIS — C61 MALIGNANT NEOPLASM OF PROSTATE (MULTI): Primary | ICD-10-CM

## 2024-12-31 PROCEDURE — 99215 OFFICE O/P EST HI 40 MIN: CPT | Mod: 95 | Performed by: STUDENT IN AN ORGANIZED HEALTH CARE EDUCATION/TRAINING PROGRAM

## 2024-12-31 PROCEDURE — 99205 OFFICE O/P NEW HI 60 MIN: CPT | Performed by: STUDENT IN AN ORGANIZED HEALTH CARE EDUCATION/TRAINING PROGRAM

## 2024-12-31 RX ORDER — CEFAZOLIN SODIUM 2 G/100ML
2 INJECTION, SOLUTION INTRAVENOUS ONCE
OUTPATIENT
Start: 2024-12-31 | End: 2024-12-31

## 2024-12-31 SDOH — ECONOMIC STABILITY: FOOD INSECURITY: WITHIN THE PAST 12 MONTHS, YOU WORRIED THAT YOUR FOOD WOULD RUN OUT BEFORE YOU GOT MONEY TO BUY MORE.: NEVER TRUE

## 2024-12-31 SDOH — ECONOMIC STABILITY: FOOD INSECURITY: WITHIN THE PAST 12 MONTHS, THE FOOD YOU BOUGHT JUST DIDN'T LAST AND YOU DIDN'T HAVE MONEY TO GET MORE.: NEVER TRUE

## 2024-12-31 ASSESSMENT — PAIN SCALES - GENERAL: PAINLEVEL_OUTOF10: 0-NO PAIN

## 2024-12-31 ASSESSMENT — ENCOUNTER SYMPTOMS
LOSS OF SENSATION IN FEET: 0
NEUROLOGICAL NEGATIVE: 1
BRUISES/BLEEDS EASILY: 1
PSYCHIATRIC NEGATIVE: 1
OCCASIONAL FEELINGS OF UNSTEADINESS: 0
CARDIOVASCULAR NEGATIVE: 1
CONSTITUTIONAL NEGATIVE: 1
MUSCULOSKELETAL NEGATIVE: 1
DEPRESSION: 0
EYES NEGATIVE: 1
GASTROINTESTINAL NEGATIVE: 1
RESPIRATORY NEGATIVE: 1
ENDOCRINE NEGATIVE: 1

## 2024-12-31 ASSESSMENT — PATIENT HEALTH QUESTIONNAIRE - PHQ9
1. LITTLE INTEREST OR PLEASURE IN DOING THINGS: NOT AT ALL
2. FEELING DOWN, DEPRESSED OR HOPELESS: NOT AT ALL
SUM OF ALL RESPONSES TO PHQ9 QUESTIONS 1 AND 2: 0

## 2024-12-31 ASSESSMENT — COLUMBIA-SUICIDE SEVERITY RATING SCALE - C-SSRS
2. HAVE YOU ACTUALLY HAD ANY THOUGHTS OF KILLING YOURSELF?: NO
1. IN THE PAST MONTH, HAVE YOU WISHED YOU WERE DEAD OR WISHED YOU COULD GO TO SLEEP AND NOT WAKE UP?: NO
6. HAVE YOU EVER DONE ANYTHING, STARTED TO DO ANYTHING, OR PREPARED TO DO ANYTHING TO END YOUR LIFE?: NO

## 2024-12-31 NOTE — PROGRESS NOTES
Radiation Oncology Nursing Note    IPSS (International Prostate Symptom Score):   3 / 35, bother 1   - He is not experiencing urinary incontinence.      - He is not experiencing dysuria, hematuria, flank pain.     Sexual function:   - Quality of erections during the last 4 weeks: 3 = Firm enough for masturbation and foreplay only  - Use of erectile dysfunction medications:  Viagra    Bowel function:    - Denies hematochezia or pain.    - He does not have a history of hemorrhoids.    - He does not have a history of inflammatory bowel disease (Crohn's, Ulcerative Colitis).    Prior Radiotherapy:  No  Radiation Treatments       No radiation treatments to show. (Treatments may have been administered in another system.)          Current Systemic Treatment:  No     Presence of Pacemaker or ICD:  No    History of Autoimmune or Connective Tissue Disorders:  No    Pain: The patient's current pain level was assessed.  They report currently having a pain of 0 out of 10.  They feel their pain is under control without the use of pain medications.    Review of Systems:  Review of Systems   Constitutional: Negative.    HENT:  Negative.     Eyes: Negative.    Respiratory: Negative.     Cardiovascular: Negative.    Gastrointestinal: Negative.    Endocrine: Negative.    Musculoskeletal: Negative.    Skin: Negative.    Neurological: Negative.    Hematological:  Bruises/bleeds easily (plavix).   Psychiatric/Behavioral: Negative.

## 2024-12-31 NOTE — PROGRESS NOTES
Staff Physician: Jasmina Duarte MD PhD  Referring Physician: Hayder John MD  Date of Service: 12/31/2024  Patient name: Zan Reed   MRN: [id number]    RADIATION ONCOLOGY CONSULT NOTE  Virtual Visit Statement: An interactive audio and video telecommunications system which permits real-time communications between the patient at the originating site and provider at the distant site was utilized to provide this telehealth service.    Verbal consent for encounter: Verbal consent was requested and obtained from the patient on this date for a telehealth visit.    IDENTIFYING DATA:  DIAGNOSIS: Newly diagnosed, localized   Cancer Staging   Prostate cancer (Multi)  Staging form: Prostate, AJCC 8th Edition  - Clinical stage from 10/25/2024: cT1c, cN0, PSA: 4.7, Grade Group: 2 - Signed by Jasmina Duarte MD PhD on 12/31/2024    DISEASE STATE: No prior treatment  DISEASE STATUS: Treatment pending work-up  Problem List Items Addressed This Visit       Prostate cancer (Multi)    Relevant Orders    Referral to Radiation Oncology    Prostate Specific Antigen, Screen    Rad Onc Intent to Treat     Mr. Zan Reed is a 69-year-old with newly diagnosed prostate adenocarcinoma, referred by Hayder Perdomo MD, for evaluation and discussion of treatment recommendations.    HISTORY OF PRESENT ILLNESS:    8/14/2020 - PSA 2.44  5/7/2022 - PSA 5.61  7/29/2022 - PSA 3.9  6/17/2023 - PSA 3.85  8/9/2024 - PSA 4.69  9/10/2024 - MRI prostate demonstrates PI RADS 4 lesion.   10/25/2024 - Prostate biopsy by Dr. John. Heidy 3+4=7, GG2, 2/12 cores+ (left base), 1/1 targeted core, +PNI, Decipher 0.64    Today, Mr. Zan Reed is on video by himself.  Symptomatically, he reports:    1.  Full independence in activities of daily living. Works full time as an .   2.  Urinary function is normal. IPSS of 3, with increase in urinary stream  3.  Bowel function is normal. Last colonoscopy was 1/12/2018, normal, next  colonoscopy recommended for 2028  4.  Normal appetite. No unintentional weight gain or loss.   5.  Pain score: 0/10   6.  He denies a personal history of MI or stroke. Pre-diabetes, HgbA1c is 6.0.    PAST MEDICAL HISTORY:  Past Medical History:   Diagnosis Date    Acute myocardial infarction, unspecified 02/18/2015    AMI (acute myocardial infarction)    Benign prostatic hyperplasia 08/2024    Elevated PSA 08/2024    Erectile dysfunction 2022    Heart disease 2017    Hypercholesteremia     Hypertension     Other conditions influencing health status 04/23/2017    History of cough    Personal history of other specified conditions 05/08/2022    History of elevated prostate specific antigen (PSA)    Prostate cancer (Multi) 11/2024    Sacrococcygeal disorders, not elsewhere classified 05/12/2015    Sacro-iliac pain     PAST SURGICAL HISTORY:  Past Surgical History:   Procedure Laterality Date    CORONARY ANGIOPLASTY WITH STENT PLACEMENT  05/17/2016    Cath Stent Placement    OTHER SURGICAL HISTORY  05/02/2014    Secondary Repair Of Ruptured Achilles Tendon    OTHER SURGICAL HISTORY  05/02/2014    Tenodesis Of Biceps Tendon At Elbow    TOTAL HIP ARTHROPLASTY Left     TOTAL KNEE ARTHROPLASTY Bilateral     US GUIDED ASPIRATION OF ABSCESS, HEMATOMA, CYST  08/14/2015    US GUIDED ASPIRATION OF ABSCESS, HEMATOMA, CYST 8/14/2015 [facility] ANCILLARY LEGACY     ALLERGIES:  Allergies   Allergen Reactions    Cat Dander Other     Sneezing, congestion    Cat's Claw Other    Clindamycin Rash    Penicillins Rash and Other     Hasn't happened since he was a little child, thinks rash and swelling were the reactions     MEDICATIONS:    Current Outpatient Medications:     albuterol 90 mcg/actuation inhaler, Inhale if needed., Disp: , Rfl:     aspirin (Adult Low Dose Aspirin) 81 mg EC tablet, Take 1 tablet (81 mg) by mouth once daily., Disp: , Rfl:     atorvastatin (Lipitor) 80 mg tablet, Take 1 tablet (80 mg) by mouth once daily., Disp: 90  tablet, Rfl: 1    clopidogrel (Plavix) 75 mg tablet, Take 1 tablet (75 mg) by mouth once daily., Disp: 90 tablet, Rfl: 1    fluticasone propion-salmeteroL (Advair Diskus) 100-50 mcg/dose diskus inhaler, Inhale 1 puff every 12 hours., Disp: 60 each, Rfl: 3    fluticasone propion-salmeteroL (Wixela Inhub) 100-50 mcg/dose diskus inhaler, Inhale 1 puff every 12 hours., Disp: 60 each, Rfl: 1    levothyroxine (LevoxyL) 100 mcg tablet, Take by mouth., Disp: , Rfl:     metoprolol tartrate (Lopressor) 25 mg tablet, Take 1/2 tablet twice daily, Disp: 30 tablet, Rfl: 1    nitroglycerin (Nitrostat) 0.4 mg SL tablet, Place under the tongue., Disp: , Rfl:     sildenafil (Viagra) 100 mg tablet, Take 1 tablet (100 mg) by mouth if needed for erectile dysfunction., Disp: 30 tablet, Rfl: 0    tadalafil (Cialis) 5 mg tablet, Take 1 tablet (5 mg) by mouth once daily., Disp: 90 tablet, Rfl: 3   SOCIAL HISTORY:  Social History     Tobacco Use    Smoking status: Never    Smokeless tobacco: Never   Substance Use Topics    Alcohol use: Yes     Alcohol/week: 3.0 standard drinks of alcohol     Types: 3 Cans of beer per week     Comment: per week     FAMILY HISTORY:  Family History   Problem Relation Name Age of Onset    Diverticulosis Mother      Diabetes Father Migel Reed ()     Prostate cancer Father Migel Reed ()     Cancer Father Migel Reed ()     Coronary artery disease Other Grandparent     Cancer Other      Diabetes Other         REVIEW OF SYSTEMS:  Please refer to RN note.    PHYSICAL EXAMINATION:  Exam limited by telemedicine.  Patient is alert and in no acute distress. Normal work of breathing. Cranial nerves grossly intact.     CTCAE (v5) ADVERSE EVENTS:  Toxicity Assessment          2024    14:00   Toxicity Assessment   Treatment Site Prostate RT   Urinary Incontinence Grade 1       weak stream, IPSS 3   Erectile Dysfunction Grade 2       viagra PRN   Urinary Frequency Grade 0   Urinary  "Urgency Grade 0       PERFORMANCE STATUS:  KPS/ECO, Fully active, able to carry on all pre-disease performed without restriction (ECOG equivalent 0)    LABORATORY AND IMAGING DATA:  Imaging: All imaging was personally reviewed and interpreted in clinic. Findings as per HPI and EMR.    Laboratory/Pathology:  All pertinent labs and pathology were personally reviewed and interpreted in clinic. Findings as per HPI and EMR.  Lab Results   Component Value Date    PSA 3.67 2024    PSA 3.85 2023    PSA 3.9 2022    PSA 5.61 (H) 2022    PSA 2.44 2020    PSASCREEN 4.69 (H) 2024     No results found for: \"TESTOSTERONE\", \"TESTOTOTMS\"      Lab Results   Component Value Date    BUN 15 2024    CREATININE 1.13 2024    EGFR 71 2024     2024    K 4.0 2024     2024    CO2 27 2024    CALCIUM 8.7 2024    HGBA1C 6.0 (H) 2024        IMPRESSION:  69 year-old gentleman with newly diagnosed prostate adenocarcinoma, iPSA 4.7, GG2 (2/12 cores+ (left base), 1/1 targeted core, +PNI, Decipher 0.64), Decipher 0.64.     He falls into intermediate favorable risk prostate cancer based on his clinical-pathologic factors. He is not in any pain.    Per SSA tables, he has at least 10 years of life expectancy.   We discussed the recent update to the ProtecT randomized trial that compared 15-year outcomes for prostate cancer patients managed with active surveillance or definitive therapy and found that, while overall survival was no different, there were higher rates of distant metastases and disease progression with active surveillance (NEJ ), albeit the AS on that trial was less rigorous. We discussed that AS involves a secondary confirmatory biopsy, followed by routine PSA check, with surveillance imaging and repeat biopsies no more than once a year or if concern for progression by PSA.      He is a candidate for definitive management, " including either surgery or radiation. I explained the logistics of radiation therapy, including CT simulation, the use of daily image guidance to improve prostate localization, and the recommendation of fiducials and spacer gel placement to reduce potential toxic rectal toxicities.  Potential acute and long-term side effects of radiation were discussed, including acute and late side effects, including toxicities to bladder, rectum, bowel, urethra, erectile dysfunction, infertility and risk of secondary malignancy.  I discussed various EBRT treatment regimens, including moderate hypofractionation over 20 treatments, and ultra hypofractionation over 5 treatments. His IPSS is 4.    Mr. Reed asked a number of excellent questions that demonstrated good understanding, and would like to proceed with radiation therapy. Plans to go to [place] in first two weeks of June.      PLAN:  -check PSA  -we will coordinate spaceOAR + fiducial placement with urology, followed [place] [facility]   [place]plan for 5fx radiation, [place] [facility]    Thank you for the opportunity to participate in the care of this kind patient.    Jasmina Duarte MD PhD  , Radiation Oncology

## 2025-01-15 ENCOUNTER — CLINICAL SUPPORT (OUTPATIENT)
Dept: PREADMISSION TESTING | Facility: HOSPITAL | Age: 70
End: 2025-01-15
Payer: COMMERCIAL

## 2025-01-15 DIAGNOSIS — C61 MALIGNANT NEOPLASM OF PROSTATE (MULTI): ICD-10-CM

## 2025-01-15 RX ORDER — DEXTROMETHORPHAN HYDROBROMIDE, GUAIFENESIN 5; 100 MG/5ML; MG/5ML
650 LIQUID ORAL EVERY 8 HOURS PRN
COMMUNITY

## 2025-01-15 RX ORDER — BISMUTH SUBSALICYLATE 262 MG
1 TABLET,CHEWABLE ORAL DAILY
COMMUNITY

## 2025-01-15 RX ORDER — NAPROXEN 375 MG/1
375 TABLET ORAL AS NEEDED
COMMUNITY

## 2025-01-15 NOTE — CPM/PAT NURSE NOTE
CPM/PAT Nurse Note      Name: Zan Reed (Zan Reed)  /Age: 1955/69 y.o.       Past Medical History:   Diagnosis Date    Acute myocardial infarction, unspecified 2015    AMI (acute myocardial infarction)    Asthma     mild, well controlled, used rescue inhaler last 1 month ago due to over excertion    Benign prostatic hyperplasia 2024    Elevated PSA 2024    Erectile dysfunction     Heart disease 2017    stent in , no issues on plavix- stop and clearance recieved from dr. Abbott in note from 24    Hypercholesteremia     Hyperglycemia     A1c= 6% on 24    Hypertension     Hypothyroidism     OA (osteoarthritis)     Personal history of other specified conditions 2022    History of elevated prostate specific antigen (PSA)    Prostate cancer (Multi) 2024    Sacrococcygeal disorders, not elsewhere classified 2015    Sacro-iliac pain, per patient: all back pain resolved after THR    Vitamin D deficiency        Past Surgical History:   Procedure Laterality Date    CORONARY ANGIOPLASTY WITH STENT PLACEMENT  2016    Cath Stent Placement    OTHER SURGICAL HISTORY  2014    Secondary Repair Of Ruptured Achilles Tendon    OTHER SURGICAL HISTORY Left 2014    Tenodesis Of Biceps Tendon At Elbow    TOTAL HIP ARTHROPLASTY Left     TOTAL KNEE ARTHROPLASTY Bilateral     US GUIDED ASPIRATION OF ABSCESS, HEMATOMA, CYST  2015    US GUIDED ASPIRATION OF ABSCESS, HEMATOMA, CYST 2015 [facility] ANCILLARY LEGACY       Patient Sexual activity questions deferred to the physician.    Family History   Problem Relation Name Age of Onset    Diverticulosis Mother      Diabetes Father Migel Reed ()     Prostate cancer Father Migel Reed ()     Cancer Father Migel Reed ()     Coronary artery disease Other Grandparent     Cancer Other      Diabetes Other         Allergies   Allergen Reactions    Cat Dander Other      Sneezing, congestion    Cat's Claw Other    Clindamycin Rash    Penicillins Rash and Other     Hasn't happened since he was a little child, thinks rash and swelling were the reactions       Prior to Admission medications    Medication Sig Start Date End Date Taking? Authorizing Provider   acetaminophen (Tylenol 8 HOUR) 650 mg ER tablet Take 1 tablet (650 mg) by mouth every 8 hours if needed for mild pain (1 - 3). Do not crush, chew, or split.    Historical Provider, MD   albuterol 90 mcg/actuation inhaler Inhale if needed.    Historical Provider, MD   aspirin (Adult Low Dose Aspirin) 81 mg EC tablet Take 1 tablet (81 mg) by mouth once daily.    Historical Provider, MD   atorvastatin (Lipitor) 80 mg tablet Take 1 tablet (80 mg) by mouth once daily. 11/16/24 11/16/25  Jakob Lr MD   cholecalciferol, vitamin D3, (VITAMIN D3 ORAL) Take by mouth once daily.    Historical Provider, MD   clopidogrel (Plavix) 75 mg tablet Take 1 tablet (75 mg) by mouth once daily. 11/16/24   Jakob Lr MD   fluticasone propion-salmeteroL (Wixela Inhub) 100-50 mcg/dose diskus inhaler Inhale 1 puff every 12 hours.  Patient taking differently: Inhale 1 puff once daily. 12/9/24   Jakob Lr MD   levothyroxine (LevoxyL) 100 mcg tablet Take by mouth.    Historical Provider, MD   metoprolol tartrate (Lopressor) 25 mg tablet Take 1/2 tablet twice daily 11/16/24   Jakob Lr MD   multivitamin tablet Take 1 tablet by mouth once daily.    Historical Provider, MD   naproxen (Naprosyn) 375 mg tablet Take 1 tablet (375 mg) by mouth if needed for mild pain (1 - 3).    Historical Provider, MD   nitroglycerin (Nitrostat) 0.4 mg SL tablet Place under the tongue. 6/2/15   Historical Provider, MD   sildenafil (Viagra) 100 mg tablet Take 1 tablet (100 mg) by mouth if needed for erectile dysfunction. 11/16/24 2/14/25  Jakob Lr MD   tadalafil (Cialis) 5 mg tablet Take 1 tablet (5 mg) by mouth once daily. 1/12/24   Hayder John MD    fluticasone propion-salmeteroL (Advair Diskus) 100-50 mcg/dose diskus inhaler Inhale 1 puff every 12 hours. 10/12/24 1/15/25  Jakob Lr MD        PAT ROS     DASI Risk Score    No data to display       Caprini DVT Assessment    No data to display       Modified Frailty Index    No data to display       CHADS2 Stroke Risk  Current as of yesterday        N/A 3 to 100%: High Risk   2 to < 3%: Medium Risk   0 to < 2%: Low Risk     Last Change: N/A          This score determines the patient's risk of having a stroke if the patient has atrial fibrillation.        This score is not applicable to this patient. Components are not calculated.          Revised Cardiac Risk Index    No data to display       Apfel Simplified Score    No data to display       Risk Analysis Index Results This Encounter    No data found in the last 10 encounters.       Prodigy: High Risk  Total Score: 16              Prodigy Age Score      Prodigy Gender Score          ARISCAT Score for Postoperative Pulmonary Complications    No data to display       [place] Perioperative Risk for Myocardial Infarction or Cardiac Arrest (ANAY)    No data to display         Nurse Plan of Action:     RN screening call complete.  Reviewed allergies, medications and pharmacy, medical, surgical and social history with patient.  Chart updated.

## 2025-01-16 ENCOUNTER — LAB (OUTPATIENT)
Dept: LAB | Facility: LAB | Age: 70
End: 2025-01-16
Payer: MEDICARE

## 2025-01-16 ENCOUNTER — PRE-ADMISSION TESTING (OUTPATIENT)
Dept: PREADMISSION TESTING | Facility: HOSPITAL | Age: 70
End: 2025-01-16
Payer: COMMERCIAL

## 2025-01-16 ENCOUNTER — DOCUMENTATION (OUTPATIENT)
Dept: PREADMISSION TESTING | Facility: HOSPITAL | Age: 70
End: 2025-01-16

## 2025-01-16 VITALS
WEIGHT: 194 LBS | HEIGHT: 70 IN | OXYGEN SATURATION: 97 % | BODY MASS INDEX: 27.77 KG/M2 | SYSTOLIC BLOOD PRESSURE: 130 MMHG | HEART RATE: 77 BPM | RESPIRATION RATE: 16 BRPM | TEMPERATURE: 96 F | DIASTOLIC BLOOD PRESSURE: 76 MMHG

## 2025-01-16 DIAGNOSIS — R39.9 LOWER URINARY TRACT SYMPTOMS (LUTS): ICD-10-CM

## 2025-01-16 DIAGNOSIS — Z01.818 PREOP TESTING: Primary | ICD-10-CM

## 2025-01-16 DIAGNOSIS — Z01.818 PREOP TESTING: ICD-10-CM

## 2025-01-16 LAB
ANION GAP SERPL CALC-SCNC: 9 MMOL/L (ref 10–20)
APPEARANCE UR: CLEAR
BASOPHILS # BLD AUTO: 0.02 X10*3/UL (ref 0–0.1)
BASOPHILS NFR BLD AUTO: 0.4 %
BILIRUB UR STRIP.AUTO-MCNC: NEGATIVE MG/DL
BUN SERPL-MCNC: 14 MG/DL (ref 6–23)
CALCIUM SERPL-MCNC: 9.1 MG/DL (ref 8.6–10.3)
CHLORIDE SERPL-SCNC: 104 MMOL/L (ref 98–107)
CO2 SERPL-SCNC: 29 MMOL/L (ref 21–32)
COLOR UR: ABNORMAL
CREAT SERPL-MCNC: 1.14 MG/DL (ref 0.5–1.3)
EGFRCR SERPLBLD CKD-EPI 2021: 70 ML/MIN/1.73M*2
EOSINOPHIL # BLD AUTO: 0.16 X10*3/UL (ref 0–0.7)
EOSINOPHIL NFR BLD AUTO: 3.1 %
ERYTHROCYTE [DISTWIDTH] IN BLOOD BY AUTOMATED COUNT: 14 % (ref 11.5–14.5)
GLUCOSE SERPL-MCNC: 170 MG/DL (ref 74–99)
GLUCOSE UR STRIP.AUTO-MCNC: ABNORMAL MG/DL
HCT VFR BLD AUTO: 43.8 % (ref 41–52)
HGB BLD-MCNC: 14.8 G/DL (ref 13.5–17.5)
IMM GRANULOCYTES # BLD AUTO: 0.03 X10*3/UL (ref 0–0.7)
IMM GRANULOCYTES NFR BLD AUTO: 0.6 % (ref 0–0.9)
KETONES UR STRIP.AUTO-MCNC: NEGATIVE MG/DL
LEUKOCYTE ESTERASE UR QL STRIP.AUTO: NEGATIVE
LYMPHOCYTES # BLD AUTO: 1.33 X10*3/UL (ref 1.2–4.8)
LYMPHOCYTES NFR BLD AUTO: 25.6 %
MCH RBC QN AUTO: 26.3 PG (ref 26–34)
MCHC RBC AUTO-ENTMCNC: 33.8 G/DL (ref 32–36)
MCV RBC AUTO: 78 FL (ref 80–100)
MONOCYTES # BLD AUTO: 0.48 X10*3/UL (ref 0.1–1)
MONOCYTES NFR BLD AUTO: 9.2 %
NEUTROPHILS # BLD AUTO: 3.17 X10*3/UL (ref 1.2–7.7)
NEUTROPHILS NFR BLD AUTO: 61.1 %
NITRITE UR QL STRIP.AUTO: NEGATIVE
NRBC BLD-RTO: 0 /100 WBCS (ref 0–0)
PH UR STRIP.AUTO: 5.5 [PH]
PLATELET # BLD AUTO: 165 X10*3/UL (ref 150–450)
POTASSIUM SERPL-SCNC: 4.2 MMOL/L (ref 3.5–5.3)
PROT UR STRIP.AUTO-MCNC: NEGATIVE MG/DL
RBC # BLD AUTO: 5.63 X10*6/UL (ref 4.5–5.9)
RBC # UR STRIP.AUTO: NEGATIVE /UL
SODIUM SERPL-SCNC: 138 MMOL/L (ref 136–145)
SP GR UR STRIP.AUTO: 1.02
UROBILINOGEN UR STRIP.AUTO-MCNC: NORMAL MG/DL
WBC # BLD AUTO: 5.2 X10*3/UL (ref 4.4–11.3)

## 2025-01-16 PROCEDURE — 81003 URINALYSIS AUTO W/O SCOPE: CPT

## 2025-01-16 PROCEDURE — 99204 OFFICE O/P NEW MOD 45 MIN: CPT | Performed by: PHYSICIAN ASSISTANT

## 2025-01-16 PROCEDURE — 80048 BASIC METABOLIC PNL TOTAL CA: CPT

## 2025-01-16 PROCEDURE — 85025 COMPLETE CBC W/AUTO DIFF WBC: CPT

## 2025-01-16 ASSESSMENT — ENCOUNTER SYMPTOMS
DIARRHEA: 0
BLOOD IN STOOL: 0
UNEXPECTED WEIGHT CHANGE: 0
ARTHRALGIAS: 0
WHEEZING: 0
ABDOMINAL PAIN: 0
COUGH: 0
EYE DISCHARGE: 0
CHILLS: 0
EYE PAIN: 0
EXCESSIVE BLEEDING: 0
LIMITED RANGE OF MOTION: 0
FEVER: 0
DIFFICULTY URINATING: 0
CONSTIPATION: 0
DYSPNEA AT REST: 0
NUMBNESS: 0
DYSPNEA WITH EXERTION: 0
LIGHT-HEADEDNESS: 0
VISUAL CHANGE: 0
MYALGIAS: 0
CONFUSION: 0
RHINORRHEA: 0
WOUND: 1
ABDOMINAL DISTENTION: 0
DOUBLE VISION: 0
NECK STIFFNESS: 0
WEAKNESS: 0
NAUSEA: 0
SHORTNESS OF BREATH: 0
BRUISES/BLEEDS EASILY: 1
HEMOPTYSIS: 0
DYSURIA: 0
PALPITATIONS: 0
SKIN CHANGES: 0
TROUBLE SWALLOWING: 0
VOMITING: 0
NECK PAIN: 0
SINUS CONGESTION: 0

## 2025-01-16 NOTE — CPM/PAT H&P
[facility] Evaluation       Name: Zan Reed (Zan Reed)  /Age: 1955/69 y.o.       Date of Consult: 25    Referring Provider: Dr. Torres    Surgery, Date, and Length:     Insertion Fiducial Marker Prostate ~ [facility]   25, 40MIN    Zan Reed is a 69 y.o. year-old male who presents to the [facility] for perioperative risk assessment prior to surgery.    Patient presents with a primary diagnosis of prostate cancer. PSA 24 was 4.69. MRI 9/10/24 shows PIRADS 4 in mid to apical left peripheral zone without extracapsular extension.  Prostate biopsy 10/25/24 showed lali 7 (3+4) adenocarcinoma in 2 of 6 core samples.  Family history is positive for prostate cancer in father, diagnosed in his 60's.     He has established care with radiation oncology and the plan is to proceed with radiation. Pt wishes to proceed with fiducial marker placement as planned.     This note was created in part upon personal review of patient's medical records.      Patient is scheduled to have Insertion Fiducial Marker Prostate ~ [facility]      Pt denies any past history of anesthetic complications such as PONV, awareness, prolonged sedation, dental damage, aspiration, cardiac arrest, difficult intubation, difficult I.V. access or unexpected hospital admissions.  NO malignant hyperthermia and or pseudocholinesterase deficiency.  No history of blood transfusions     The patient is not a Druze and will accept blood and blood products if medically indicated.   Type and screen NOT sent.     Past Medical History:   Diagnosis Date    Acute myocardial infarction, unspecified 2015    AMI (acute myocardial infarction)    Asthma     mild, well controlled, used rescue inhaler last 1 month ago due to over excertion    Benign prostatic hyperplasia 2024    Elevated PSA 2024    Erectile dysfunction     Heart disease 2017    stent in , no issues on plavix- stop and clearance recieved  from dr. Abbott in note from 24    Hypercholesteremia     Hyperglycemia     A1c= 6% on 24    Hypertension     Hypothyroidism     OA (osteoarthritis)     Personal history of other specified conditions 2022    History of elevated prostate specific antigen (PSA)    Prostate cancer (Multi) 2024    Sacrococcygeal disorders, not elsewhere classified 2015    Sacro-iliac pain, per patient: all back pain resolved after THR    Vitamin D deficiency        Past Surgical History:   Procedure Laterality Date    CORONARY ANGIOPLASTY WITH STENT PLACEMENT  2016    Cath Stent Placement    OTHER SURGICAL HISTORY  2014    Secondary Repair Of Ruptured Achilles Tendon    OTHER SURGICAL HISTORY Left 2014    Tenodesis Of Biceps Tendon At Elbow    TOTAL HIP ARTHROPLASTY Left     TOTAL KNEE ARTHROPLASTY Bilateral     US GUIDED ASPIRATION OF ABSCESS, HEMATOMA, CYST  2015    US GUIDED ASPIRATION OF ABSCESS, HEMATOMA, CYST 2015 [facility]       Patient Sexual activity questions deferred to the physician.    Family History   Problem Relation Name Age of Onset    Diverticulosis Mother      Diabetes Father Migel Reed ()     Prostate cancer Father Migel Reed ()     Cancer Father Migel Reed ()     Coronary artery disease Other Grandparent     Cancer Other      Diabetes Other       Social History     Socioeconomic History    Marital status:      Spouse name: Not on file    Number of children: Not on file    Years of education: Not on file    Highest education level: Not on file   Occupational History    Not on file   Tobacco Use    Smoking status: Never    Smokeless tobacco: Never   Vaping Use    Vaping status: Never Used   Substance and Sexual Activity    Alcohol use: Yes     Alcohol/week: 3.0 standard drinks of alcohol     Types: 3 Cans of beer per week     Comment: per week    Drug use: Never    Sexual activity: Defer   Other Topics  Concern    Not on file   Social History Narrative    Not on file     Social Drivers of Health     Financial Resource Strain: Not on file   Food Insecurity: No Food Insecurity (12/31/2024)    Hunger Vital Sign     Worried About Running Out of Food in the Last Year: Never true     Ran Out of Food in the Last Year: Never true   Transportation Needs: Not on file   Physical Activity: Not on file   Stress: Not on file   Social Connections: Not on file   Intimate Partner Violence: Not on file   Housing Stability: Not on file        Allergies   Allergen Reactions    Cat Dander Other     Sneezing, congestion    Cat's Claw Other    Clindamycin Rash    Penicillins Rash and Other     Hasn't happened since he was a little child, thinks rash and swelling were the reactions       Current Outpatient Medications   Medication Instructions    acetaminophen (TYLENOL 8 HOUR) 650 mg, Every 8 hours PRN    albuterol 90 mcg/actuation inhaler As needed    aspirin (Adult Low Dose Aspirin) 81 mg EC tablet 1 tablet, Daily    atorvastatin (LIPITOR) 80 mg, oral, Daily    cholecalciferol, vitamin D3, (VITAMIN D3 ORAL) Daily    clopidogrel (PLAVIX) 75 mg, oral, Daily    fluticasone propion-salmeteroL (Wixela Inhub) 100-50 mcg/dose diskus inhaler 1 puff, inhalation, Every 12 hours    levothyroxine (LevoxyL) 100 mcg tablet Take by mouth.    metoprolol tartrate (Lopressor) 25 mg tablet Take 1/2 tablet twice daily    multivitamin tablet 1 tablet, Daily    naproxen (NAPROSYN) 375 mg, As needed    nitroglycerin (Nitrostat) 0.4 mg SL tablet Place under the tongue.    sildenafil (VIAGRA) 100 mg, oral, As needed    tadalafil (CIALIS) 5 mg, oral, Daily           PAT ROS:   Constitutional:    no fever   no chills   no unexpected weight change  Neuro/Psych:    no numbness   no weakness   no light-headedness   no confusion  Eyes:    no discharge   no pain   no vision loss   no diplopia   no visual disturbance   use of corrective lenses  Ears:    no ear pain    no hearing loss   no tinnitus  Nose:    no nasal discharge   no sinus congestion   no epistaxis  Mouth:    no dental issues   no mouth pain   no oral bleeding   no mouth lesions  Throat:    no throat pain   no dysphagia  Neck:    no neck pain   no neck stiffness  Cardio:    Functional 4 Mets. Patient denies SOB walking up 2 flights of stairs   Walking dog 30-45 min daily; pickle ball   Cooking, cleaning and grocery shopping   no chest pain   no palpitations   no peripheral edema   no dyspnea   no LENNON  Respiratory:    no cough   no wheezing   no hemoptysis   no shortness of breath  Endocrine:    no cold intolerance   no heat intolerance  GI:    no abdominal distention   no abdominal pain   no constipation   no diarrhea   no nausea   no vomiting   no blood in stool  :    no difficulty urinating   no dysuria   no oliguria  Musculoskeletal:    no arthralgias   no myalgias   no decreased ROM  Hematologic:    bruises/bleeds easily (plavix)   no excessive bleeding   no history of blood transfusion   no blood clots  Skin:   no skin changes   sores/wound (lower lip cold sore)   no rash      Physical Exam  Constitutional:       General: He is not in acute distress.     Appearance: Normal appearance. He is not ill-appearing, toxic-appearing or diaphoretic.   HENT:      Head: Normocephalic and atraumatic.      Nose: Nose normal. No congestion or rhinorrhea.      Mouth/Throat:      Mouth: Mucous membranes are moist.      Pharynx: No posterior oropharyngeal erythema.   Eyes:      Extraocular Movements: Extraocular movements intact.      Conjunctiva/sclera: Conjunctivae normal.   Cardiovascular:      Rate and Rhythm: Normal rate and regular rhythm.      Pulses: Normal pulses.      Heart sounds: Normal heart sounds. No murmur heard.     No friction rub. No gallop.   Pulmonary:      Effort: Pulmonary effort is normal. No respiratory distress.      Breath sounds: Normal breath sounds. No stridor. No wheezing, rhonchi or rales.  "  Abdominal:      General: Bowel sounds are normal. There is no distension.      Palpations: Abdomen is soft. There is no mass.      Tenderness: There is no abdominal tenderness. There is no guarding or rebound.      Hernia: No hernia is present.   Musculoskeletal:         General: No swelling, tenderness, deformity or signs of injury. Normal range of motion.      Cervical back: Normal range of motion and neck supple. No rigidity or tenderness.   Skin:     General: Skin is warm and dry.      Coloration: Skin is not jaundiced or pale.      Findings: No bruising, erythema, lesion or rash.      Comments: Very small cold core on lower lip   Neurological:      General: No focal deficit present.      Mental Status: He is alert and oriented to person, place, and time.      Cranial Nerves: No cranial nerve deficit.      Sensory: No sensory deficit.      Motor: No weakness.      Coordination: Coordination normal.   Psychiatric:         Mood and Affect: Mood normal.         Behavior: Behavior normal.          PAT AIRWAY:   Airway:     Mallampati::  II    Neck ROM::  Full   No broken teeth, no dentures and no missing teeth          Visit Vitals  /76   Pulse 77   Temp 35.6 °C (96 °F)   Resp 16   Ht 1.77 m (5' 9.69\")   Wt 88 kg (194 lb 0.1 oz)   SpO2 97%   BMI 28.09 kg/m²   Smoking Status Never   BSA 2.08 m²      LABS:  Lab Results   Component Value Date    WBC 5.2 01/16/2025    HGB 14.8 01/16/2025    HCT 43.8 01/16/2025    MCV 78 (L) 01/16/2025     01/16/2025        Lab Results   Component Value Date    GLUCOSE 170 (H) 01/16/2025    CALCIUM 9.1 01/16/2025     01/16/2025    K 4.2 01/16/2025    CO2 29 01/16/2025     01/16/2025    BUN 14 01/16/2025    CREATININE 1.14 01/16/2025      Urinalysis with Reflex Culture and Microscopic  Order: [id number] - Part of Panel Order [id number]   Status: Final result       Visible to patient: Yes (seen)       Dx: Preop testing; Lower urinary tract sy...    0 Result Notes   "        Component  Ref Range & Units 09:04 5 mo ago 1 yr ago 2 yr ago 4 yr ago   Color, Urine  Light-Yellow, Yellow, Dark-Yellow Light-Yellow Light-Yellow YELLOW R YELLOW R YELLOW R   Appearance, Urine  Clear Clear Clear CLEAR R CLEAR R CLEAR R   Specific Gravity, Urine  1.005 - 1.035 1.022 1.024 1.017 1.014 1.015   pH, Urine  5.0, 5.5, 6.0, 6.5, 7.0, 7.5, 8.0 5.5 5.5 6.0 R 5.0 R 6.0 R   Protein, Urine  NEGATIVE, 10 (TRACE), 20 (TRACE) mg/dL NEGATIVE NEGATIVE NEGATIVE R NEGATIVE R NEGATIVE R   Glucose, Urine  Normal mg/dL 100 (1+) Abnormal  300 (3+) Abnormal  50(1+) Abnormal  R 150(1+) Abnormal  R NEGATIVE R   Blood, Urine  NEGATIVE NEGATIVE NEGATIVE NEGATIVE NEGATIVE NEGATIVE   Ketones, Urine  NEGATIVE mg/dL NEGATIVE NEGATIVE NEGATIVE NEGATIVE NEGATIVE   Bilirubin, Urine  NEGATIVE NEGATIVE NEGATIVE NEGATIVE NEGATIVE NEGATIVE   Urobilinogen, Urine  Normal mg/dL Normal Normal <2.0 R <2.0 R <2.0 R   Nitrite, Urine  NEGATIVE NEGATIVE NEGATIVE NEGATIVE NEGATIVE NEGATIVE   Leukocyte Esterase, Urine  NEGATIVE NEGATIVE NEGATIVE NEGATIVE NEGATIVE NEGATIVE   Resulting Agency [facility]             Specimen Collected: 01/16/25 09:04     Lab Results   Component Value Date    HGBA1C 6.0 (H) 08/09/2024         EKG 9/24/24  Normal sinus rhythm  Septal infarct , age undetermined  Abnormal ECG    Cardiac cath 12/12/18  CONCLUSIONS:   1. No significant obstructive coronary artery disease in a right dominant system.   2. Widely patent proximal to mid LAD stents and distal RCA stent.   3. Normal left ventricular systolic function.      Assessment and Plan:     69 y.o.  male  scheduled for Insertion Fiducial Marker Prostate ~ [facility] on 1/28/25 with Dr. Torres for  prostate cancer.   Presents to [facility] today for perioperative risk stratification and optimization      Cardiovascular:  Patient has no active cardiac symptoms.   Patient denies any chest pain, tightness, heaviness, pressure, radiating pain,  "palpitations, irregular heartbeats, lightheadedness, cough, congestion, shortness of breath, LENNON, PND, near syncope, weight loss or gain.    METS: 4+  RCRI: 2 points, 10.1% risk for postoperative MACE (NTG, prior MI)    CAD - s/p MI (2015) and PCI ( 2/2015, 6/2015) with two cardiac stents in place (LAD and RCA) ; cont ASA 81mg on dos     HTN - cont metoprolol on dos   Encouraged lifestyle modifications, low-sodium diet, and increase activity as tolerated.  Monitor BP and follow up with managing physician for readings sustaining >140/90.     HLD - cont statin on dos     Per Dr. Abbott's note 9/24/24:  \"He is fine to proceed with prostate biopsy as planned. He may hold his Plavix for 1 week before the procedure but needs to remain on aspirin through the procedure to protect his stents.\"      Pulmonary:  No pulmonary diagnosis, however patient is at increased risk of perioperative complications secondary to  age > 60  Stop Bang score is 3 placing patient at intermediate risk for EH  ARISCAT: <26 points, 1.6% risk of in-hospital postoperative pulmonary complication  PRODIGY: High risk for opioid induced respiratory depression    **Pt provided with deep breathing exercises PAT visit today**    Asthma - cont inhalers as prescribed; rescue inhaler last used Dec 2024    Endocrine:  Hypothyroidism - cont levothyroxine on dos      Neuro:  No neurologic diagnosis, however, the patient is at increased risk for perioperative delirium secondary to  age, Patient instructed on and provided cognitive exercises    Hematology:  Patient instructed to ambulate as soon as possible postoperatively to decrease thromboembolic risk.   Initiate mechanical DVT prophylaxis as soon as possible and initiate chemical prophylaxis when deemed safe from a bleeding standpoint post surgery.     LABS: CBC, BMP, UA flex ordered. Labs show elevated nonfasting blood glucose and urine shows glucosuria, but no sign of infection.  A1c performed <6 months " ago and is 6.0%; no need to repeat.     Caprini: 7    Risk assessment complete.  Patient is scheduled for a low surgical risk procedure.        Preoperative medication instructions were provided and reviewed with the patient.  Any additional testing or evaluation was explained to the patient.  Nothing by mouth instructions were discussed and patient's questions were answered prior to conclusion to this encounter.  Patient verbalized understanding of preoperative instructions given in preadmission testing; discharge instructions available in EMR.    This note was dictated by a speech recognition.  Minor errors may have been detected in a speech recognition.

## 2025-01-16 NOTE — PREPROCEDURE INSTRUCTIONS
Medication List            Accurate as of January 16, 2025  8:29 AM. Always use your most recent med list.                acetaminophen 650 mg ER tablet  Commonly known as: Tylenol 8 HOUR  Medication Adjustments for Surgery: Take on the morning of surgery  Notes to patient: If needed     Adult Low Dose Aspirin 81 mg EC tablet  Generic drug: aspirin  Medication Adjustments for Surgery: Take on the morning of surgery     albuterol 90 mcg/actuation inhaler  Medication Adjustments for Surgery: Take/Use as prescribed     atorvastatin 80 mg tablet  Commonly known as: Lipitor  Take 1 tablet (80 mg) by mouth once daily.  Medication Adjustments for Surgery: Take on the morning of surgery     clopidogrel 75 mg tablet  Commonly known as: Plavix  Take 1 tablet (75 mg) by mouth once daily.  Additional Medication Adjustments for Surgery: Take last dose 7 days before surgery  Notes to patient: Last dose 1/20/25     fluticasone propion-salmeteroL 100-50 mcg/dose diskus inhaler  Commonly known as: Wixela Inhub  Inhale 1 puff every 12 hours.  Medication Adjustments for Surgery: Take/Use as prescribed     LevoxyL 100 mcg tablet  Generic drug: levothyroxine  Medication Adjustments for Surgery: Take on the morning of surgery     metoprolol tartrate 25 mg tablet  Commonly known as: Lopressor  Take 1/2 tablet twice daily  Medication Adjustments for Surgery: Take on the morning of surgery     multivitamin tablet  Additional Medication Adjustments for Surgery: Take last dose 7 days before surgery     naproxen 375 mg tablet  Commonly known as: Naprosyn  Additional Medication Adjustments for Surgery: Take last dose 7 days before surgery     nitroglycerin 0.4 mg SL tablet  Commonly known as: Nitrostat  Medication Adjustments for Surgery: Take/Use as prescribed     sildenafil 100 mg tablet  Commonly known as: Viagra  Take 1 tablet (100 mg) by mouth if needed for erectile dysfunction.  Medication Adjustments for Surgery: Take last dose 3 days  before surgery  Notes to patient: Last dose on or before 1/24/25     tadalafil 5 mg tablet  Commonly known as: Cialis  Take 1 tablet (5 mg) by mouth once daily.  Medication Adjustments for Surgery: Take last dose 3 days before surgery  Notes to patient: Last dose on or before 1/24/25     VITAMIN D3 ORAL  Medication Adjustments for Surgery: Do Not take on the morning of surgery                          **Concerning above medication instructions, if medication is normally taken at night, continue normal schedule.**  **DO NOT TAKE NIGHT PRIOR AND MORNING OF SURGERY**    CONTACT SURGEON'S OFFICE IF YOU DEVELOP:  * Fever = 100.4 F   * New respiratory symptoms (e.g. cough, shortness of breath, respiratory distress, sore throat)  * Recent loss of taste or smell  *Flu like symptoms such as headache, fatigue or gastrointestinal symptoms  * You develop any open sores, shingles, burning or painful urination   AND/OR:  * You no longer wish to have the surgery.  * Any other personal circumstances change that may lead to the need to cancel or defer this surgery.  *You were admitted to any hospital within one week of your planned procedure.    SMOKING:  *Quitting smoking can make a huge difference to your health and recovery from surgery.    *If you need help with quitting, call 9-317-QUIT-NOW.    THE DAY OF SURGERY:  *Do not eat any food after midnight the night before surgery.   *You must drink 13.5 ounces of clear liquids (i.e. water, black coffee (no milk or cream), tea, apple juice or electrolyte drinks (gatorade)) 2 hours before your arrival time.  *You may chew gum until 2 hours before your surgery    SURGICAL TIME  *You will be contacted between 2 p.m. and 6 p.m. the business day before your surgery with your arrival time.  *If you haven't received a call by 6pm, call [phone].  *Scheduled surgery times may change and you will be notified if this occurs-check your personal voicemail for any updates.    ON THE MORNING  OF SURGERY:  *Wear comfortable, loose fitting clothing.   *Do not use moisturizers, creams, lotions or perfume.  *All jewelry and valuables should be left at home.  *Prosthetic devices such as contact lenses, hearing aids, dentures, eyelash extensions, hairpins and body piercing must be removed before surgery.    BRING WITH YOU:  *Photo ID and insurance card  *Current list of medicines and allergies  *Pacemaker/Defibrillator/Heart stent cards  *CPAP machine and mask  *Slings/splints/crutches  *Copy of your complete Advanced Directive/DHPOA-if applicable  *Neurostimulator implant remote    PARKING AND ARRIVAL:  *Check in at the Main Entrance desk and let them know you are here for surgery.  *You will be directed to the 2nd floor surgical waiting area.    AFTER OUTPATIENT SURGERY:  *A responsible adult MUST accompany you at the time of discharge and stay with you for 24 hours after your surgery.  *You may NOT drive yourself home after surgery.  *You may use a taxi or ride sharing service ([employer], [employer]) to return home ONLY if you are accompanied by a friend or family member.  *Instructions for resuming your medications will be provided by your surgeon.       Preoperative Deep Breathing Exercises  Why it is important to do deep breathing exercises before my surgery?  Deep breathing exercises strengthen your breathing muscles.  This helps you to recover after your surgery and decreases the chance of breathing complications.  How are the deep breathing exercises done?  Sit straight with your back supported.  Breathe in deeply and slowly through your nose. Your lower rib cage should expand and your abdomen may move forward.  Hold that breath for 3 to 5 seconds.  Breathe out through pursed lips, slowly and completely.  Rest and repeat 10 times every hour while awake.  Rest longer if you become dizzy or lightheaded.        Preoperative Brain Exercises    What are brain exercises?  A brain exercise is any activity that engages  your thinking (cognitive) skills.    What types of activities are considered brain exercises?  Jigsaw puzzles, crossword puzzles, word jumble, memory games, word search, and many more.  Many can be found free online or on your phone via a mobile asa.    Why should I do brain exercises before my surgery?  More recent research has shown brain exercise before surgery can lower the risk of postoperative delirium (confusion) which can be especially important for older adults.  Patients who did brain exercises for 5 to 10 hours the days before surgery, cut their risk of postoperative delirium in half up to 1 week after surgery.

## 2025-01-16 NOTE — CPM/PAT NURSE NOTE
CPM/PAT Nurse Note      Name: Zan Reed (Zan Reed)  /Age: 1955/69 y.o.       Past Medical History:   Diagnosis Date    Acute myocardial infarction, unspecified 2015    AMI (acute myocardial infarction)    Asthma     mild, well controlled, used rescue inhaler last 1 month ago due to over excertion    Benign prostatic hyperplasia 2024    Elevated PSA 2024    Erectile dysfunction     Heart disease 2017    stent in , no issues on plavix- stop and clearance recieved from dr. Abbott in note from 24    Hypercholesteremia     Hyperglycemia     A1c= 6% on 24    Hypertension     Hypothyroidism     OA (osteoarthritis)     Personal history of other specified conditions 2022    History of elevated prostate specific antigen (PSA)    Prostate cancer (Multi) 2024    Sacrococcygeal disorders, not elsewhere classified 2015    Sacro-iliac pain, per patient: all back pain resolved after THR    Vitamin D deficiency        Past Surgical History:   Procedure Laterality Date    CORONARY ANGIOPLASTY WITH STENT PLACEMENT  2016    Cath Stent Placement    OTHER SURGICAL HISTORY  2014    Secondary Repair Of Ruptured Achilles Tendon    OTHER SURGICAL HISTORY Left 2014    Tenodesis Of Biceps Tendon At Elbow    TOTAL HIP ARTHROPLASTY Left     TOTAL KNEE ARTHROPLASTY Bilateral     US GUIDED ASPIRATION OF ABSCESS, HEMATOMA, CYST  2015    US GUIDED ASPIRATION OF ABSCESS, HEMATOMA, CYST 2015 [facility]CILLARY LEGACY       Patient Sexual activity questions deferred to the physician.    Family History   Problem Relation Name Age of Onset    Diverticulosis Mother      Diabetes Father Migel Reed ()     Prostate cancer Father Migel Reed ()     Cancer Father Migel Reed ()     Coronary artery disease Other Grandparent     Cancer Other      Diabetes Other         Allergies   Allergen Reactions    Cat Dander Other      Sneezing, congestion    Cat's Claw Other    Clindamycin Rash    Penicillins Rash and Other     Hasn't happened since he was a little child, thinks rash and swelling were the reactions       Prior to Admission medications    Medication Sig Start Date End Date Taking? Authorizing Provider   acetaminophen (Tylenol 8 HOUR) 650 mg ER tablet Take 1 tablet (650 mg) by mouth every 8 hours if needed for mild pain (1 - 3). Do not crush, chew, or split.    Historical Provider, MD   albuterol 90 mcg/actuation inhaler Inhale if needed.    Historical Provider, MD   aspirin (Adult Low Dose Aspirin) 81 mg EC tablet Take 1 tablet (81 mg) by mouth once daily.    Historical Provider, MD   atorvastatin (Lipitor) 80 mg tablet Take 1 tablet (80 mg) by mouth once daily. 11/16/24 11/16/25  Jakob Lr MD   cholecalciferol, vitamin D3, (VITAMIN D3 ORAL) Take by mouth once daily.    Historical Provider, MD   clopidogrel (Plavix) 75 mg tablet Take 1 tablet (75 mg) by mouth once daily. 11/16/24   Jakob Lr MD   fluticasone propion-salmeteroL (Wixela Inhub) 100-50 mcg/dose diskus inhaler Inhale 1 puff every 12 hours.  Patient taking differently: Inhale 1 puff once daily. 12/9/24   Jakob Lr MD   levothyroxine (LevoxyL) 100 mcg tablet Take by mouth.    Historical Provider, MD   metoprolol tartrate (Lopressor) 25 mg tablet Take 1/2 tablet twice daily 11/16/24   Jakob Lr MD   multivitamin tablet Take 1 tablet by mouth once daily.    Historical Provider, MD   naproxen (Naprosyn) 375 mg tablet Take 1 tablet (375 mg) by mouth if needed for mild pain (1 - 3).  Patient not taking: Reported on 1/16/2025    Historical Provider, MD   nitroglycerin (Nitrostat) 0.4 mg SL tablet Place under the tongue.  Patient not taking: Reported on 1/16/2025 6/2/15   Historical Provider, MD   sildenafil (Viagra) 100 mg tablet Take 1 tablet (100 mg) by mouth if needed for erectile dysfunction. 11/16/24 2/14/25  Jakob Lr MD   tadalafil  (Cialis) 5 mg tablet Take 1 tablet (5 mg) by mouth once daily. 1/12/24   Hayder John MD   fluticasone propion-salmeteroL (Advair Diskus) 100-50 mcg/dose diskus inhaler Inhale 1 puff every 12 hours. 10/12/24 1/15/25  Jakob Lr MD        PAT ROS     DASI Risk Score    No data to display       Caprini DVT Assessment    No data to display       Modified Frailty Index    No data to display       CHADS2 Stroke Risk  Current as of yesterday        N/A 3 to 100%: High Risk   2 to < 3%: Medium Risk   0 to < 2%: Low Risk     Last Change: N/A          This score determines the patient's risk of having a stroke if the patient has atrial fibrillation.        This score is not applicable to this patient. Components are not calculated.          Revised Cardiac Risk Index    No data to display       Apfel Simplified Score    No data to display       Risk Analysis Index Results This Encounter    No data found in the last 10 encounters.       Prodigy: High Risk  Total Score: 16              Prodigy Age Score      Prodigy Gender Score          ARISCAT Score for Postoperative Pulmonary Complications    No data to display       Rico Perioperative Risk for Myocardial Infarction or Cardiac Arrest (ANAY)    No data to display         Nurse Plan of Action:     After Visit Summary (AVS) reviewed and patient verbalized good understanding of medications and NPO instructions.

## 2025-01-28 ENCOUNTER — ANESTHESIA EVENT (OUTPATIENT)
Dept: OPERATING ROOM | Facility: HOSPITAL | Age: 70
End: 2025-01-28
Payer: COMMERCIAL

## 2025-01-28 ENCOUNTER — HOSPITAL ENCOUNTER (OUTPATIENT)
Facility: HOSPITAL | Age: 70
Setting detail: OUTPATIENT SURGERY
Discharge: HOME | End: 2025-01-28
Attending: STUDENT IN AN ORGANIZED HEALTH CARE EDUCATION/TRAINING PROGRAM | Admitting: STUDENT IN AN ORGANIZED HEALTH CARE EDUCATION/TRAINING PROGRAM
Payer: COMMERCIAL

## 2025-01-28 ENCOUNTER — ANESTHESIA (OUTPATIENT)
Dept: OPERATING ROOM | Facility: HOSPITAL | Age: 70
End: 2025-01-28
Payer: COMMERCIAL

## 2025-01-28 VITALS
HEIGHT: 70 IN | WEIGHT: 195.33 LBS | DIASTOLIC BLOOD PRESSURE: 83 MMHG | HEART RATE: 58 BPM | RESPIRATION RATE: 14 BRPM | BODY MASS INDEX: 27.96 KG/M2 | OXYGEN SATURATION: 95 % | TEMPERATURE: 97.3 F | SYSTOLIC BLOOD PRESSURE: 120 MMHG

## 2025-01-28 DIAGNOSIS — C61 MALIGNANT NEOPLASM OF PROSTATE (MULTI): Primary | ICD-10-CM

## 2025-01-28 DIAGNOSIS — J45.909 UNCOMPLICATED ASTHMA, UNSPECIFIED ASTHMA SEVERITY, UNSPECIFIED WHETHER PERSISTENT (HHS-HCC): ICD-10-CM

## 2025-01-28 DIAGNOSIS — I25.10 CORONARY ARTERY DISEASE INVOLVING NATIVE HEART WITHOUT ANGINA PECTORIS, UNSPECIFIED VESSEL OR LESION TYPE: ICD-10-CM

## 2025-01-28 LAB — GLUCOSE BLD MANUAL STRIP-MCNC: 135 MG/DL (ref 74–99)

## 2025-01-28 PROCEDURE — 3700000001 HC GENERAL ANESTHESIA TIME - INITIAL BASE CHARGE: Performed by: STUDENT IN AN ORGANIZED HEALTH CARE EDUCATION/TRAINING PROGRAM

## 2025-01-28 PROCEDURE — A55876 PR PLACE RADIOTHER DEVICE/MARKER, PROSTATE: Performed by: ANESTHESIOLOGY

## 2025-01-28 PROCEDURE — 82947 ASSAY GLUCOSE BLOOD QUANT: CPT

## 2025-01-28 PROCEDURE — 7100000009 HC PHASE TWO TIME - INITIAL BASE CHARGE: Performed by: STUDENT IN AN ORGANIZED HEALTH CARE EDUCATION/TRAINING PROGRAM

## 2025-01-28 PROCEDURE — 3600000009 HC OR TIME - EACH INCREMENTAL 1 MINUTE - PROCEDURE LEVEL FOUR: Performed by: STUDENT IN AN ORGANIZED HEALTH CARE EDUCATION/TRAINING PROGRAM

## 2025-01-28 PROCEDURE — 7100000002 HC RECOVERY ROOM TIME - EACH INCREMENTAL 1 MINUTE: Performed by: STUDENT IN AN ORGANIZED HEALTH CARE EDUCATION/TRAINING PROGRAM

## 2025-01-28 PROCEDURE — 3700000002 HC GENERAL ANESTHESIA TIME - EACH INCREMENTAL 1 MINUTE: Performed by: STUDENT IN AN ORGANIZED HEALTH CARE EDUCATION/TRAINING PROGRAM

## 2025-01-28 PROCEDURE — C1889 IMPLANT/INSERT DEVICE, NOC: HCPCS | Performed by: STUDENT IN AN ORGANIZED HEALTH CARE EDUCATION/TRAINING PROGRAM

## 2025-01-28 PROCEDURE — 2500000004 HC RX 250 GENERAL PHARMACY W/ HCPCS (ALT 636 FOR OP/ED): Performed by: STUDENT IN AN ORGANIZED HEALTH CARE EDUCATION/TRAINING PROGRAM

## 2025-01-28 PROCEDURE — 2500000004 HC RX 250 GENERAL PHARMACY W/ HCPCS (ALT 636 FOR OP/ED)

## 2025-01-28 PROCEDURE — 55874 TPRNL PLMT BIODEGRDABL MATRL: CPT | Performed by: STUDENT IN AN ORGANIZED HEALTH CARE EDUCATION/TRAINING PROGRAM

## 2025-01-28 PROCEDURE — 2720000007 HC OR 272 NO HCPCS: Performed by: STUDENT IN AN ORGANIZED HEALTH CARE EDUCATION/TRAINING PROGRAM

## 2025-01-28 PROCEDURE — 2780000003 HC OR 278 NO HCPCS: Performed by: STUDENT IN AN ORGANIZED HEALTH CARE EDUCATION/TRAINING PROGRAM

## 2025-01-28 PROCEDURE — A55876 PR PLACE RADIOTHER DEVICE/MARKER, PROSTATE

## 2025-01-28 PROCEDURE — 76872 US TRANSRECTAL: CPT | Performed by: STUDENT IN AN ORGANIZED HEALTH CARE EDUCATION/TRAINING PROGRAM

## 2025-01-28 PROCEDURE — 7100000001 HC RECOVERY ROOM TIME - INITIAL BASE CHARGE: Performed by: STUDENT IN AN ORGANIZED HEALTH CARE EDUCATION/TRAINING PROGRAM

## 2025-01-28 PROCEDURE — 3600000004 HC OR TIME - INITIAL BASE CHARGE - PROCEDURE LEVEL FOUR: Performed by: STUDENT IN AN ORGANIZED HEALTH CARE EDUCATION/TRAINING PROGRAM

## 2025-01-28 PROCEDURE — 7100000010 HC PHASE TWO TIME - EACH INCREMENTAL 1 MINUTE: Performed by: STUDENT IN AN ORGANIZED HEALTH CARE EDUCATION/TRAINING PROGRAM

## 2025-01-28 PROCEDURE — 55876 PLACE RT DEVICE/MARKER PROS: CPT | Performed by: STUDENT IN AN ORGANIZED HEALTH CARE EDUCATION/TRAINING PROGRAM

## 2025-01-28 DEVICE — GOLD MARKERS, 1.2MM, SOFT TISSUE, 20CM 17GA NEEDLES, 3-PK, STRL: Type: IMPLANTABLE DEVICE | Site: PROSTATE | Status: FUNCTIONAL

## 2025-01-28 DEVICE — SPACEOAR SYSTEMS
Type: IMPLANTABLE DEVICE | Site: PROSTATE | Status: FUNCTIONAL
Brand: SPACEOAR VUE™ SYSTEM - 10ML

## 2025-01-28 RX ORDER — LIDOCAINE HYDROCHLORIDE 20 MG/ML
INJECTION, SOLUTION EPIDURAL; INFILTRATION; INTRACAUDAL; PERINEURAL AS NEEDED
Status: DISCONTINUED | OUTPATIENT
Start: 2025-01-28 | End: 2025-01-28

## 2025-01-28 RX ORDER — CEFAZOLIN SODIUM 2 G/100ML
2 INJECTION, SOLUTION INTRAVENOUS ONCE
Status: DISCONTINUED | OUTPATIENT
Start: 2025-01-28 | End: 2025-01-28 | Stop reason: HOSPADM

## 2025-01-28 RX ORDER — CEFAZOLIN 1 G/1
INJECTION, POWDER, FOR SOLUTION INTRAVENOUS AS NEEDED
Status: DISCONTINUED | OUTPATIENT
Start: 2025-01-28 | End: 2025-01-28

## 2025-01-28 RX ORDER — LIDOCAINE HYDROCHLORIDE 10 MG/ML
0.1 INJECTION, SOLUTION EPIDURAL; INFILTRATION; INTRACAUDAL; PERINEURAL ONCE
Status: DISCONTINUED | OUTPATIENT
Start: 2025-01-28 | End: 2025-01-28 | Stop reason: HOSPADM

## 2025-01-28 RX ORDER — SODIUM CHLORIDE, SODIUM LACTATE, POTASSIUM CHLORIDE, CALCIUM CHLORIDE 600; 310; 30; 20 MG/100ML; MG/100ML; MG/100ML; MG/100ML
100 INJECTION, SOLUTION INTRAVENOUS CONTINUOUS
Status: DISCONTINUED | OUTPATIENT
Start: 2025-01-28 | End: 2025-01-28 | Stop reason: HOSPADM

## 2025-01-28 RX ORDER — OXYCODONE HYDROCHLORIDE 5 MG/1
5 TABLET ORAL EVERY 4 HOURS PRN
Status: DISCONTINUED | OUTPATIENT
Start: 2025-01-28 | End: 2025-01-28 | Stop reason: HOSPADM

## 2025-01-28 RX ORDER — ACETAMINOPHEN 325 MG/1
650 TABLET ORAL EVERY 4 HOURS PRN
Status: DISCONTINUED | OUTPATIENT
Start: 2025-01-28 | End: 2025-01-28 | Stop reason: HOSPADM

## 2025-01-28 RX ORDER — METOCLOPRAMIDE HYDROCHLORIDE 5 MG/ML
10 INJECTION INTRAMUSCULAR; INTRAVENOUS ONCE AS NEEDED
Status: DISCONTINUED | OUTPATIENT
Start: 2025-01-28 | End: 2025-01-28 | Stop reason: HOSPADM

## 2025-01-28 RX ORDER — FENTANYL CITRATE 50 UG/ML
INJECTION, SOLUTION INTRAMUSCULAR; INTRAVENOUS AS NEEDED
Status: DISCONTINUED | OUTPATIENT
Start: 2025-01-28 | End: 2025-01-28

## 2025-01-28 RX ORDER — PROPOFOL 10 MG/ML
INJECTION, EMULSION INTRAVENOUS AS NEEDED
Status: DISCONTINUED | OUTPATIENT
Start: 2025-01-28 | End: 2025-01-28

## 2025-01-28 RX ADMIN — FENTANYL CITRATE 25 MCG: 50 INJECTION, SOLUTION INTRAMUSCULAR; INTRAVENOUS at 13:29

## 2025-01-28 RX ADMIN — CEFAZOLIN 2 G: 1 INJECTION, POWDER, FOR SOLUTION INTRAMUSCULAR; INTRAVENOUS at 13:29

## 2025-01-28 RX ADMIN — PROPOFOL 150 MCG/KG/MIN: 10 INJECTION, EMULSION INTRAVENOUS at 13:30

## 2025-01-28 RX ADMIN — PROPOFOL 50 MG: 10 INJECTION, EMULSION INTRAVENOUS at 13:29

## 2025-01-28 RX ADMIN — LIDOCAINE HYDROCHLORIDE 20 MG: 20 INJECTION, SOLUTION EPIDURAL; INFILTRATION; INTRACAUDAL; PERINEURAL at 13:29

## 2025-01-28 SDOH — HEALTH STABILITY: MENTAL HEALTH: CURRENT SMOKER: 0

## 2025-01-28 ASSESSMENT — ENCOUNTER SYMPTOMS: CONSTITUTIONAL NEGATIVE: 1

## 2025-01-28 ASSESSMENT — PAIN - FUNCTIONAL ASSESSMENT
PAIN_FUNCTIONAL_ASSESSMENT: UNABLE TO SELF-REPORT
PAIN_FUNCTIONAL_ASSESSMENT: 0-10
PAIN_FUNCTIONAL_ASSESSMENT: UNABLE TO SELF-REPORT
PAIN_FUNCTIONAL_ASSESSMENT: 0-10

## 2025-01-28 ASSESSMENT — PAIN SCALES - GENERAL
PAINLEVEL_OUTOF10: 0 - NO PAIN

## 2025-01-28 NOTE — ANESTHESIA POSTPROCEDURE EVALUATION
Patient: Zan Reed    Procedure Summary       Date: 01/28/25 Room / Location: [facility] OR 01 / Virtual [facility] OR    Anesthesia Start: 1324 Anesthesia Stop: 1344    Procedure: Insertion Fiducial Marker Prostate ~ SpaceOAR (Prostate) Diagnosis:       Malignant neoplasm of prostate (Multi)      (Malignant neoplasm of prostate (Multi) [C61])    Surgeons: Prasad Torres MD Responsible Provider: Zach Brasher MD    Anesthesia Type: MAC ASA Status: 2            Anesthesia Type: MAC    Vitals Value Taken Time   /83 01/28/25 1414   Temp 36.3 °C (97.3 °F) 01/28/25 1414   Pulse 59 01/28/25 1414   Resp 14 01/28/25 1414   SpO2 95 % 01/28/25 1414       Anesthesia Post Evaluation    Patient location during evaluation: PACU  Patient participation: complete - patient participated  Level of consciousness: awake  Pain management: adequate  Airway patency: patent  Cardiovascular status: acceptable  Respiratory status: acceptable  Hydration status: acceptable  Postoperative Nausea and Vomiting: none    No notable events documented.

## 2025-01-28 NOTE — H&P
History Of Present Illness  Zan Reed is a 69 y.o. male presenting with prostate cancer.     Past Medical History  Past Medical History:   Diagnosis Date    Acute myocardial infarction, unspecified 2015    AMI (acute myocardial infarction)    Asthma     mild, well controlled, used rescue inhaler last 1 month ago due to over excertion    Benign prostatic hyperplasia 2024    Elevated PSA 2024    Erectile dysfunction     Heart disease 2017    stent in , no issues on plavix- stop and clearance recieved from dr. Abbott in note from 24    Hypercholesteremia     Hyperglycemia     A1c= 6% on 24    Hypertension     Hypothyroidism     OA (osteoarthritis)     Personal history of other specified conditions 2022    History of elevated prostate specific antigen (PSA)    Prostate cancer (Multi) 2024    Sacrococcygeal disorders, not elsewhere classified 2015    Sacro-iliac pain, per patient: all back pain resolved after THR    Vitamin D deficiency        Surgical History  Past Surgical History:   Procedure Laterality Date    CORONARY ANGIOPLASTY WITH STENT PLACEMENT  2016    Cath Stent Placement    OTHER SURGICAL HISTORY  2014    Secondary Repair Of Ruptured Achilles Tendon    OTHER SURGICAL HISTORY Left 2014    Tenodesis Of Biceps Tendon At Elbow    TOTAL HIP ARTHROPLASTY Left     TOTAL KNEE ARTHROPLASTY Bilateral     US GUIDED ASPIRATION OF ABSCESS, HEMATOMA, CYST  2015    US GUIDED ASPIRATION OF ABSCESS, HEMATOMA, CYST 2015 [facility] ANCILLARY LEGACY        Social History  He reports that he has never smoked. He has never used smokeless tobacco. He reports current alcohol use of about 3.0 standard drinks of alcohol per week. He reports that he does not use drugs.    Family History  Family History   Problem Relation Name Age of Onset    Diverticulosis Mother      Diabetes Father Migel Reed ()     Prostate cancer Father iMgel Reed  ()     Cancer Father Migel Reed ()     Coronary artery disease Other Grandparent     Cancer Other      Diabetes Other          Allergies  Cat dander, Cat's claw, Clindamycin, and Penicillins    Review of Systems   Constitutional: Negative.    All other systems reviewed and are negative.       Physical Exam  Constitutional:       Appearance: Normal appearance.   HENT:      Head: Normocephalic and atraumatic.      Mouth/Throat:      Mouth: Mucous membranes are moist.      Pharynx: Oropharynx is clear.   Abdominal:      Palpations: Abdomen is soft.   Musculoskeletal:         General: Normal range of motion.   Neurological:      General: No focal deficit present.      Mental Status: He is alert and oriented to person, place, and time.   Psychiatric:         Mood and Affect: Mood normal.         Behavior: Behavior normal.          Last Recorded Vitals  There were no vitals taken for this visit.    Relevant Results      Assessment/Plan   Assessment & Plan  Malignant neoplasm of prostate (Multi)      Proceed to OR        Prasad Torres MD

## 2025-01-28 NOTE — OP NOTE
Insertion Fiducial Marker Prostate ~ SpaceOAR Operative Note     Date: 2025  OR Location: [facility] A OR    Name: Zan Reed, : 1955, Age: 69 y.o., MRN: [id number], Sex: male    Diagnosis  Pre-op Diagnosis      * Malignant neoplasm of prostate (Multi) [C61] Post-op Diagnosis     * Malignant neoplasm of prostate (Multi) [C61]     Procedures  Insertion Fiducial Marker Prostate ~ SpaceOAR  88087 - MA PLMT INTERSTITIAL DEV RADIAT TX PROSTATE 1/MULT    MA TRANSPERINEAL PLMT BIODEGRADABLE MATRL 1/MLT NJX [91949]  Surgeons      * Prasad Torres - Primary    Resident/Fellow/Other Assistant:  Surgeons and Role:  * No surgeons found with a matching role *    Staff:   Circulator: Deneen  Circulator: Brit Khalil Person: Andrae    Anesthesia Staff: Anesthesiologist: Zach Brasher MD  CRNA: KAREN Blackwood    Procedure Summary  Anesthesia: Monitor Anesthesia Care  ASA: II  Estimated Blood Loss: 2mL  Intra-op Medications:   Administrations occurring from 1310 to 1350 on 25:   Medication Name Total Dose   BUPivacaine HCl (Marcaine) 0.5 % (5 mg/mL) 8 mL, lidocaine (Xylocaine) 8 mL, sodium bicarbonate 1 mEq/mL (8.4 %) 4 mEq syringe 20 mL   ceFAZolin (Ancef) vial 1 g 2 g   fentaNYL (Sublimaze) injection 50 mcg/mL 25 mcg   lidocaine PF (Xylocaine-MPF) local injection 2 % 20 mg   propofol (Diprivan) injection 10 mg/mL 137.71 mg              Anesthesia Record               Intraprocedure I/O Totals          Output    Est. Blood Loss 1 mL    Total Output 1 mL          Specimen: No specimens collected              Drains and/or Catheters: * None in log *    Tourniquet Times:         Implants:  Implants       Type Name Action Serial No.      Implant GOLD MARKERS, 1.2MM, SOFT TISSUE, 20CM 17GA NEEDLES, 3-PK, STRL - [id number] Implanted       SYSTEM, SPACEOAR CHAS, HYDROGEL - [id number] Implanted                 The patient was seen in the preoperative area. The risks, benefits, complications, treatment  options, non-operative alternatives, expected recovery and outcomes were discussed with the patient. The possibilities of reaction to medication, pulmonary aspiration, injury to surrounding structures, bleeding, recurrent infection, the need for additional procedures, failure to diagnose a condition, and creating a complication requiring transfusion or operation were discussed with the patient. The patient concurred with the proposed plan, giving informed consent.  The site of surgery was properly noted/marked if necessary per policy. The patient has been actively warmed in preoperative area. Preoperative antibiotics have been ordered and given within 1 hours of incision. Venous thrombosis prophylaxis are not indicated.    Procedure Details:        Preoperative Diagnosis  Prostate cancer.       Postoperative Diagnosis  Same.       Procedure Performed  SpaceOAR and Fiducial placement, Transrectal ultrasound .   Surgeon  Prasad Torres MD, M.S.   Assisted by  N/a      Details of Procedure     Indication for procedure: This is a 69-year-old gentleman with a recent diagnosis of prostate cancer. A  course of radiation therapy has been prescribed. To reduce rectal irradiation during  radiation therapy, patient presents for injection of an absorbable polyethylene glycol (PEG) hydrogel (SpaceOAR).     Anesthesia  MAC.   Estimated Blood Loss (ml)  minimal   Specimen(s) Removed  None.   Drain(s)  None.   Implant(s)  None.   Complications  None.   Indications (History)  Prostate cancer.   Findings of Procedure  Successful placement of fiducial markers (3) and Spaceoar hydrogel   Description of Procedure  After administration of anesthesia and antibiotics, the patient was placed in the dorsal lithotomy position and prepped in the usual sterile fashion. A bilateral pudendal nerve block was performed using standard technique (1% lidocaine solution) The needle was advanced to the mid perineum region and an adequate dose of Lidocaine  was injected. Once this area became anesthetized (~5 min), the needle was advanced until it was proximal to the pudendal nerve, and an additional dose of Lidocaine was injected.    Fiducial markers were placed at the base, apex and mid prostate. SpaceOAR hydrogel was prepared as described in the ’s Instructions For Use while the patient was prepped. With the subject maintained in the dorsal lithotomy position, the transrectal ultrasound (TRUS) probe was positioned to enable visual guidance of the needle into the space between the prostate and the rectum. Under transrectal ultrasound guidance, the 15 cm 18G needle was inserted through the rectourethralis muscle and the needle tip advanced into the perirectal fat inferior to the prostate all by using a transperineal approach and with side-fire transrectal ultrasound guidance. The needle position was confirmed in both sagittal and axial fields. Saline was used to dissect the space between the Denonvilliers’ fascia and anterior rectal wall (“hydrodissection”). A space was created with hydrodissection.    With the needle tip at mid gland, the axial field was viewed to confirm the needle was not in the rectal wall (movement of the needle tip without corresponding movement of the rectal wall will confirm perirectal placement). While maintaining the desired position, aspiration was done to ensure that the needle was not in vascular space. The assembled SpaceOAR delivery system was then attached to the 18G needle. Under ultrasound guidance (sagittal plane), a smooth, continuous injection technique was used to dispense the SpaceOAR hydrogel into the space between the prostate and rectum (Denonvilliers’ fascia and the anterior rectal wall). The entire syringe contents (10 mL total) were injected without stopping. Optimal visualization of the needle during hydrogel administration was maintained at all times.    No suspected penetration or compromise of the rectal  wall occurred.       Complications:  None; patient tolerated the procedure well.    Disposition: PACU - hemodynamically stable.  Condition: stable       Attending Attestation: I was present and scrubbed for the entire procedure.    Prasad Torres  Phone Number: [phone]

## 2025-01-28 NOTE — ANESTHESIA PREPROCEDURE EVALUATION
Patient: Zan Reed    Procedure Information       Date/Time: 01/28/25 1310    Procedure: Insertion Fiducial Marker Prostate ~ SpaceOAR (Prostate)    Location: [facility] OR 01 / Virtual [facility] OR    Surgeons: Prasad Torres MD            Relevant Problems   Cardiac   (+) Acute myocardial infarction   (+) Chest pain   (+) Coronary artery disease   (+) Coronary artery disease involving native heart without angina pectoris   (+) Hyperlipidemia      Pulmonary   (+) Asthma   (+) Mild intermittent asthma without complication (HHS-HCC)      Neuro   (+) Left lumbar radiculopathy      /Renal   (+) BPH with obstruction/lower urinary tract symptoms   (+) Malignant neoplasm of prostate (Multi)   (+) Prostate cancer (Multi)      Endocrine   (+) Hypothyroidism   (+) Hypothyroidism due to Hashimoto's thyroiditis      Musculoskeletal   (+) Arthritis of hand, left, degenerative   (+) Lumbar spinal stenosis   (+) Osteoarthritis of both knees   (+) Osteoarthrosis of knee       Clinical information reviewed:                    Past Medical History:   Diagnosis Date   • Acute myocardial infarction, unspecified 02/18/2015    AMI (acute myocardial infarction)   • Asthma     mild, well controlled, used rescue inhaler last 1 month ago due to over excertion   • Benign prostatic hyperplasia 08/2024   • Elevated PSA 08/2024   • Erectile dysfunction 2022   • Heart disease 2017    stent in 2016, no issues on plavix- stop and clearance recieved from dr. Abbott in note from 9/24/24   • Hypercholesteremia    • Hyperglycemia     A1c= 6% on 8/9/24   • Hypertension    • Hypothyroidism    • OA (osteoarthritis)    • Personal history of other specified conditions 05/08/2022    History of elevated prostate specific antigen (PSA)   • Prostate cancer (Multi) 11/2024   • Sacrococcygeal disorders, not elsewhere classified 05/12/2015    Sacro-iliac pain, per patient: all back pain resolved after THR   • Vitamin D deficiency       Past Surgical  History:   Procedure Laterality Date   • CORONARY ANGIOPLASTY WITH STENT PLACEMENT  05/17/2016    Cath Stent Placement   • OTHER SURGICAL HISTORY  05/02/2014    Secondary Repair Of Ruptured Achilles Tendon   • OTHER SURGICAL HISTORY Left 05/02/2014    Tenodesis Of Biceps Tendon At Elbow   • TOTAL HIP ARTHROPLASTY Left    • TOTAL KNEE ARTHROPLASTY Bilateral    • US GUIDED ASPIRATION OF ABSCESS, HEMATOMA, CYST  08/14/2015    US GUIDED ASPIRATION OF ABSCESS, HEMATOMA, CYST 8/14/2015 [facility] ANCILLARY LEGACY     Social History     Tobacco Use   • Smoking status: Never   • Smokeless tobacco: Never   Vaping Use   • Vaping status: Never Used   Substance Use Topics   • Alcohol use: Yes     Alcohol/week: 3.0 standard drinks of alcohol     Types: 3 Cans of beer per week     Comment: per week   • Drug use: Never      Current Outpatient Medications   Medication Instructions   • acetaminophen (TYLENOL 8 HOUR) 650 mg, Every 8 hours PRN   • albuterol 90 mcg/actuation inhaler As needed   • aspirin (Adult Low Dose Aspirin) 81 mg EC tablet 1 tablet, Daily   • atorvastatin (LIPITOR) 80 mg, oral, Daily   • cholecalciferol, vitamin D3, (VITAMIN D3 ORAL) Daily   • clopidogrel (PLAVIX) 75 mg, oral, Daily   • fluticasone propion-salmeteroL (Wixela Inhub) 100-50 mcg/dose diskus inhaler 1 puff, inhalation, Every 12 hours   • levothyroxine (LevoxyL) 100 mcg tablet Take by mouth.   • metoprolol tartrate (Lopressor) 25 mg tablet Take 1/2 tablet twice daily   • multivitamin tablet 1 tablet, Daily   • naproxen (NAPROSYN) 375 mg, As needed   • nitroglycerin (Nitrostat) 0.4 mg SL tablet Place under the tongue.   • sildenafil (VIAGRA) 100 mg, oral, As needed   • tadalafil (CIALIS) 5 mg, oral, Daily      Allergies   Allergen Reactions   • Cat Dander Other     Sneezing, congestion   • Cat's Claw Other   • Clindamycin Rash   • Penicillins Rash and Other     Hasn't happened since he was a little child, thinks rash and swelling were the reactions         "Chemistry    Lab Results   Component Value Date/Time     01/16/2025 0904    K 4.2 01/16/2025 0904     01/16/2025 0904    CO2 29 01/16/2025 0904    BUN 14 01/16/2025 0904    CREATININE 1.14 01/16/2025 0904    Lab Results   Component Value Date/Time    CALCIUM 9.1 01/16/2025 0904    ALKPHOS 91 08/09/2024 0851    AST 19 08/09/2024 0851    ALT 22 08/09/2024 0851    BILITOT 0.6 08/09/2024 0851          Lab Results   Component Value Date    HGBA1C 6.0 (H) 08/09/2024     Lab Results   Component Value Date/Time    WBC 5.2 01/16/2025 0904    HGB 14.8 01/16/2025 0904    HCT 43.8 01/16/2025 0904     01/16/2025 0904     Lab Results   Component Value Date/Time    PROTIME 14.1 (H) 03/31/2022 0332    INR 1.2 (H) 03/31/2022 0332     No results found for: \"ABORH\"  Encounter Date: 09/24/24   ECG 12 lead (Clinic Performed)   Result Value    Ventricular Rate 67    Atrial Rate 67    WA Interval 198    QRS Duration 104    QT Interval 400    QTC Calculation(Bazett) 422    P Axis 46    R Axis 15    T Axis 40    QRS Count 11    Q Onset 223    P Onset 124    P Offset 182    T Offset 423    QTC Fredericia 415    Narrative    Normal sinus rhythm  Septal infarct , age undetermined  Abnormal ECG  When compared with ECG of 19-SEP-2023 08:50,  No significant change was found  Confirmed by Wicho Abbott ([id number]) on 9/24/2024 9:54:12 AM     No results found for this or any previous visit from the past 1095 days.       Visit Vitals  Smoking Status Never     No data recorded    Physical Exam    Airway  Mallampati: II  TM distance: >3 FB  Neck ROM: full     Cardiovascular - normal exam     Dental - normal exam     Pulmonary - normal exam     Abdominal - normal exam         Anesthesia Plan    History of general anesthesia?: yes  History of complications of general anesthesia?: no    ASA 2     MAC     The patient is not a current smoker.    intravenous induction   Anesthetic plan and risks discussed with patient.  Use of blood " products discussed with patient who.    Plan discussed with CRNA and attending.

## 2025-01-28 NOTE — DISCHARGE INSTRUCTIONS
Please hold your plavix until you no longer have blood in the urine. If you do not have blood in your urine after this procedure please hold your plavix for 48 hours after today's procedure.

## 2025-01-29 ASSESSMENT — PAIN SCALES - GENERAL: PAINLEVEL_OUTOF10: 0 - NO PAIN

## 2025-02-05 ENCOUNTER — HOSPITAL ENCOUNTER (OUTPATIENT)
Dept: RADIOLOGY | Facility: EXTERNAL LOCATION | Age: 70
Discharge: HOME | End: 2025-02-05

## 2025-02-05 ENCOUNTER — HOSPITAL ENCOUNTER (OUTPATIENT)
Dept: RADIATION ONCOLOGY | Facility: HOSPITAL | Age: 70
Setting detail: RADIATION/ONCOLOGY SERIES
Discharge: HOME | End: 2025-02-05
Payer: MEDICARE

## 2025-02-05 DIAGNOSIS — C61 MALIGNANT NEOPLASM OF PROSTATE (MULTI): Primary | ICD-10-CM

## 2025-02-05 DIAGNOSIS — C61 MALIGNANT NEOPLASM OF PROSTATE (MULTI): ICD-10-CM

## 2025-02-05 PROCEDURE — 77290 THER RAD SIMULAJ FIELD CPLX: CPT | Performed by: STUDENT IN AN ORGANIZED HEALTH CARE EDUCATION/TRAINING PROGRAM

## 2025-02-05 PROCEDURE — 77334 RADIATION TREATMENT AID(S): CPT | Performed by: STUDENT IN AN ORGANIZED HEALTH CARE EDUCATION/TRAINING PROGRAM

## 2025-02-10 ENCOUNTER — HOSPITAL ENCOUNTER (OUTPATIENT)
Dept: RADIATION ONCOLOGY | Facility: HOSPITAL | Age: 70
Setting detail: RADIATION/ONCOLOGY SERIES
Discharge: HOME | End: 2025-02-10
Payer: COMMERCIAL

## 2025-02-10 PROCEDURE — 77300 RADIATION THERAPY DOSE PLAN: CPT | Performed by: STUDENT IN AN ORGANIZED HEALTH CARE EDUCATION/TRAINING PROGRAM

## 2025-02-10 PROCEDURE — 77334 RADIATION TREATMENT AID(S): CPT | Performed by: STUDENT IN AN ORGANIZED HEALTH CARE EDUCATION/TRAINING PROGRAM

## 2025-02-10 PROCEDURE — 77295 3-D RADIOTHERAPY PLAN: CPT | Performed by: STUDENT IN AN ORGANIZED HEALTH CARE EDUCATION/TRAINING PROGRAM

## 2025-02-10 PROCEDURE — 77370 RADIATION PHYSICS CONSULT: CPT | Performed by: STUDENT IN AN ORGANIZED HEALTH CARE EDUCATION/TRAINING PROGRAM

## 2025-02-18 ENCOUNTER — HOSPITAL ENCOUNTER (OUTPATIENT)
Dept: RADIATION ONCOLOGY | Facility: HOSPITAL | Age: 70
Setting detail: RADIATION/ONCOLOGY SERIES
Discharge: HOME | End: 2025-02-18
Payer: MEDICARE

## 2025-02-18 DIAGNOSIS — Z51.0 ENCOUNTER FOR ANTINEOPLASTIC RADIATION THERAPY: ICD-10-CM

## 2025-02-18 DIAGNOSIS — C61 MALIGNANT NEOPLASM OF PROSTATE (MULTI): ICD-10-CM

## 2025-02-18 LAB
RAD ONC MSQ ACTUAL FRACTIONS DELIVERED: 1
RAD ONC MSQ ACTUAL SESSION DELIVERED DOSE: 800 CGRAY
RAD ONC MSQ ACTUAL TOTAL DOSE: 800 CGRAY
RAD ONC MSQ ELAPSED DAYS: 0
RAD ONC MSQ LAST DATE: NORMAL
RAD ONC MSQ PRESCRIBED FRACTIONAL DOSE: 800 CGRAY
RAD ONC MSQ PRESCRIBED NUMBER OF FRACTIONS: 5
RAD ONC MSQ PRESCRIBED TECHNIQUE: NORMAL
RAD ONC MSQ PRESCRIBED TOTAL DOSE: 4000 CGRAY
RAD ONC MSQ PRESCRIPTION PATTERN COMMENT: NORMAL
RAD ONC MSQ START DATE: NORMAL
RAD ONC MSQ TREATMENT COURSE NUMBER: 1
RAD ONC MSQ TREATMENT SITE: NORMAL

## 2025-02-18 PROCEDURE — 77373 STRTCTC BDY RAD THER TX DLVR: CPT | Performed by: RADIOLOGY

## 2025-02-18 PROCEDURE — 77280 THER RAD SIMULAJ FIELD SMPL: CPT | Performed by: RADIOLOGY

## 2025-02-19 DIAGNOSIS — I10 BENIGN ESSENTIAL HYPERTENSION: ICD-10-CM

## 2025-02-19 DIAGNOSIS — N40.1 BPH WITH OBSTRUCTION/LOWER URINARY TRACT SYMPTOMS: ICD-10-CM

## 2025-02-19 DIAGNOSIS — N13.8 BPH WITH OBSTRUCTION/LOWER URINARY TRACT SYMPTOMS: ICD-10-CM

## 2025-02-20 ENCOUNTER — HOSPITAL ENCOUNTER (OUTPATIENT)
Dept: RADIATION ONCOLOGY | Facility: HOSPITAL | Age: 70
Setting detail: RADIATION/ONCOLOGY SERIES
Discharge: HOME | End: 2025-02-20
Payer: MEDICARE

## 2025-02-20 DIAGNOSIS — N40.1 BPH WITH OBSTRUCTION/LOWER URINARY TRACT SYMPTOMS: ICD-10-CM

## 2025-02-20 DIAGNOSIS — C61 MALIGNANT NEOPLASM OF PROSTATE (MULTI): ICD-10-CM

## 2025-02-20 DIAGNOSIS — N13.8 BPH WITH OBSTRUCTION/LOWER URINARY TRACT SYMPTOMS: ICD-10-CM

## 2025-02-20 DIAGNOSIS — I10 BENIGN ESSENTIAL HYPERTENSION: Primary | ICD-10-CM

## 2025-02-20 DIAGNOSIS — Z51.0 ENCOUNTER FOR ANTINEOPLASTIC RADIATION THERAPY: ICD-10-CM

## 2025-02-20 LAB
RAD ONC MSQ ACTUAL FRACTIONS DELIVERED: 2
RAD ONC MSQ ACTUAL SESSION DELIVERED DOSE: 800 CGRAY
RAD ONC MSQ ACTUAL TOTAL DOSE: 1600 CGRAY
RAD ONC MSQ ELAPSED DAYS: 2
RAD ONC MSQ LAST DATE: NORMAL
RAD ONC MSQ PRESCRIBED FRACTIONAL DOSE: 800 CGRAY
RAD ONC MSQ PRESCRIBED NUMBER OF FRACTIONS: 5
RAD ONC MSQ PRESCRIBED TECHNIQUE: NORMAL
RAD ONC MSQ PRESCRIBED TOTAL DOSE: 4000 CGRAY
RAD ONC MSQ PRESCRIPTION PATTERN COMMENT: NORMAL
RAD ONC MSQ START DATE: NORMAL
RAD ONC MSQ TREATMENT COURSE NUMBER: 1
RAD ONC MSQ TREATMENT SITE: NORMAL

## 2025-02-20 PROCEDURE — 77373 STRTCTC BDY RAD THER TX DLVR: CPT | Performed by: STUDENT IN AN ORGANIZED HEALTH CARE EDUCATION/TRAINING PROGRAM

## 2025-02-20 RX ORDER — TADALAFIL 5 MG/1
5 TABLET ORAL DAILY
Qty: 90 TABLET | Refills: 3 | Status: SHIPPED | OUTPATIENT
Start: 2025-02-20

## 2025-02-20 RX ORDER — TADALAFIL 5 MG/1
5 TABLET ORAL DAILY
Qty: 90 TABLET | Refills: 3 | Status: SHIPPED | OUTPATIENT
Start: 2025-02-20 | End: 2025-02-20 | Stop reason: SDUPTHER

## 2025-02-20 RX ORDER — METOPROLOL TARTRATE 25 MG/1
TABLET, FILM COATED ORAL
Qty: 30 TABLET | Refills: 1 | OUTPATIENT
Start: 2025-02-20

## 2025-02-20 RX ORDER — METOPROLOL TARTRATE 25 MG/1
TABLET, FILM COATED ORAL
Qty: 90 TABLET | Refills: 3 | Status: SHIPPED | OUTPATIENT
Start: 2025-02-20

## 2025-02-24 ENCOUNTER — HOSPITAL ENCOUNTER (OUTPATIENT)
Dept: RADIATION ONCOLOGY | Facility: HOSPITAL | Age: 70
Setting detail: RADIATION/ONCOLOGY SERIES
Discharge: HOME | End: 2025-02-24
Payer: MEDICARE

## 2025-02-24 DIAGNOSIS — Z51.0 ENCOUNTER FOR ANTINEOPLASTIC RADIATION THERAPY: ICD-10-CM

## 2025-02-24 DIAGNOSIS — C61 MALIGNANT NEOPLASM OF PROSTATE (MULTI): ICD-10-CM

## 2025-02-24 LAB
RAD ONC MSQ ACTUAL FRACTIONS DELIVERED: 3
RAD ONC MSQ ACTUAL SESSION DELIVERED DOSE: 800 CGRAY
RAD ONC MSQ ACTUAL TOTAL DOSE: 2400 CGRAY
RAD ONC MSQ ELAPSED DAYS: 6
RAD ONC MSQ LAST DATE: NORMAL
RAD ONC MSQ PRESCRIBED FRACTIONAL DOSE: 800 CGRAY
RAD ONC MSQ PRESCRIBED NUMBER OF FRACTIONS: 5
RAD ONC MSQ PRESCRIBED TECHNIQUE: NORMAL
RAD ONC MSQ PRESCRIBED TOTAL DOSE: 4000 CGRAY
RAD ONC MSQ PRESCRIPTION PATTERN COMMENT: NORMAL
RAD ONC MSQ START DATE: NORMAL
RAD ONC MSQ TREATMENT COURSE NUMBER: 1
RAD ONC MSQ TREATMENT SITE: NORMAL

## 2025-02-24 PROCEDURE — 77373 STRTCTC BDY RAD THER TX DLVR: CPT | Performed by: STUDENT IN AN ORGANIZED HEALTH CARE EDUCATION/TRAINING PROGRAM

## 2025-02-26 ENCOUNTER — HOSPITAL ENCOUNTER (OUTPATIENT)
Dept: RADIATION ONCOLOGY | Facility: HOSPITAL | Age: 70
Setting detail: RADIATION/ONCOLOGY SERIES
Discharge: HOME | End: 2025-02-26
Payer: MEDICARE

## 2025-02-26 DIAGNOSIS — C61 MALIGNANT NEOPLASM OF PROSTATE (MULTI): ICD-10-CM

## 2025-02-26 DIAGNOSIS — Z51.0 ENCOUNTER FOR ANTINEOPLASTIC RADIATION THERAPY: ICD-10-CM

## 2025-02-26 LAB
RAD ONC MSQ ACTUAL FRACTIONS DELIVERED: 4
RAD ONC MSQ ACTUAL SESSION DELIVERED DOSE: 800 CGRAY
RAD ONC MSQ ACTUAL TOTAL DOSE: 3200 CGRAY
RAD ONC MSQ ELAPSED DAYS: 8
RAD ONC MSQ LAST DATE: NORMAL
RAD ONC MSQ PRESCRIBED FRACTIONAL DOSE: 800 CGRAY
RAD ONC MSQ PRESCRIBED NUMBER OF FRACTIONS: 5
RAD ONC MSQ PRESCRIBED TECHNIQUE: NORMAL
RAD ONC MSQ PRESCRIBED TOTAL DOSE: 4000 CGRAY
RAD ONC MSQ PRESCRIPTION PATTERN COMMENT: NORMAL
RAD ONC MSQ START DATE: NORMAL
RAD ONC MSQ TREATMENT COURSE NUMBER: 1
RAD ONC MSQ TREATMENT SITE: NORMAL

## 2025-02-26 PROCEDURE — 77373 STRTCTC BDY RAD THER TX DLVR: CPT | Performed by: STUDENT IN AN ORGANIZED HEALTH CARE EDUCATION/TRAINING PROGRAM

## 2025-02-26 PROCEDURE — 77336 RADIATION PHYSICS CONSULT: CPT | Performed by: STUDENT IN AN ORGANIZED HEALTH CARE EDUCATION/TRAINING PROGRAM

## 2025-02-28 ENCOUNTER — HOSPITAL ENCOUNTER (OUTPATIENT)
Dept: RADIATION ONCOLOGY | Facility: HOSPITAL | Age: 70
Setting detail: RADIATION/ONCOLOGY SERIES
Discharge: HOME | End: 2025-02-28
Payer: MEDICARE

## 2025-02-28 ENCOUNTER — DOCUMENTATION (OUTPATIENT)
Dept: RADIATION ONCOLOGY | Facility: HOSPITAL | Age: 70
End: 2025-02-28

## 2025-02-28 VITALS
DIASTOLIC BLOOD PRESSURE: 79 MMHG | BODY MASS INDEX: 28.14 KG/M2 | OXYGEN SATURATION: 97 % | SYSTOLIC BLOOD PRESSURE: 146 MMHG | HEART RATE: 71 BPM | TEMPERATURE: 97 F | RESPIRATION RATE: 20 BRPM | WEIGHT: 196.1 LBS

## 2025-02-28 DIAGNOSIS — R39.12 WEAK URINARY STREAM: ICD-10-CM

## 2025-02-28 DIAGNOSIS — C61 MALIGNANT NEOPLASM OF PROSTATE (MULTI): Primary | ICD-10-CM

## 2025-02-28 DIAGNOSIS — Z51.0 ENCOUNTER FOR ANTINEOPLASTIC RADIATION THERAPY: ICD-10-CM

## 2025-02-28 DIAGNOSIS — C61 MALIGNANT NEOPLASM OF PROSTATE (MULTI): ICD-10-CM

## 2025-02-28 DIAGNOSIS — C61 PROSTATE CANCER (MULTI): ICD-10-CM

## 2025-02-28 LAB
RAD ONC MSQ ACTUAL FRACTIONS DELIVERED: 5
RAD ONC MSQ ACTUAL SESSION DELIVERED DOSE: 800 CGRAY
RAD ONC MSQ ACTUAL TOTAL DOSE: 4000 CGRAY
RAD ONC MSQ ELAPSED DAYS: 10
RAD ONC MSQ LAST DATE: NORMAL
RAD ONC MSQ PRESCRIBED FRACTIONAL DOSE: 800 CGRAY
RAD ONC MSQ PRESCRIBED NUMBER OF FRACTIONS: 5
RAD ONC MSQ PRESCRIBED TECHNIQUE: NORMAL
RAD ONC MSQ PRESCRIBED TOTAL DOSE: 4000 CGRAY
RAD ONC MSQ PRESCRIPTION PATTERN COMMENT: NORMAL
RAD ONC MSQ START DATE: NORMAL
RAD ONC MSQ TREATMENT COURSE NUMBER: 1
RAD ONC MSQ TREATMENT SITE: NORMAL

## 2025-02-28 PROCEDURE — 77373 STRTCTC BDY RAD THER TX DLVR: CPT | Performed by: STUDENT IN AN ORGANIZED HEALTH CARE EDUCATION/TRAINING PROGRAM

## 2025-02-28 RX ORDER — TAMSULOSIN HYDROCHLORIDE 0.4 MG/1
0.4 CAPSULE ORAL DAILY
Qty: 90 CAPSULE | Refills: 0 | Status: SHIPPED | OUTPATIENT
Start: 2025-02-28 | End: 2025-05-29

## 2025-02-28 ASSESSMENT — PAIN SCALES - GENERAL: PAINLEVEL_OUTOF10: 0-NO PAIN

## 2025-02-28 NOTE — PROGRESS NOTES
RADIATION ONCOLOGY ON-TREATMENT VISIT NOTE  Patient Name:  Zan Reed  MRN:  [id number]  :  1955    Radiation Oncologist: Jasmina Duarte MD PhD  Primary Care Provider: Jakob Lr MD  Care Team: Patient Care Team:  Jakob Lr MD as PCP - General (Internal Medicine)  Jakob Lr MD as PCP - [facility] Attributed Provider  Jakob Lr MD as PCP - [facility] PCP  Jasmina Duarte MD PhD as Radiation Oncologist (Radiation Oncology)    Date of Service: 2025     Zan Reed is a 69 y.o.-year-old with:   Cancer Staging   Prostate cancer (Multi)  Staging form: Prostate, AJCC 8th Edition  - Clinical stage from 10/25/2024: cT1c, cN0, PSA: 4.7, Grade Group: 2 - Signed by Jasmina Duarte MD PhD on 2024  Prognostic indicators: Decipher 0.64 (high)    Specialty Problems          Radiation Oncology Problems    Prostate cancer (Multi)         Treatment Summary:  Radiation Therapy    Treatment Period Technique Fraction Dose Fractions Total Dose   Course 1 2025-2025  (days elapsed: 10)         ProstSV 2025-2025 SBRT 800 / 800 cGy 5 / 5 4000 / 4,000 cGy     Concurrent systemic therapy: none    SUBJECTIVE: Feeling well. Denies any bowel changes compared to baseline. Weaker urinary stream, ibuprofen not helpful.     OBJECTIVE:   Vital Signs:  /79 (BP Location: Right arm, Patient Position: Sitting)   Pulse 71   Temp 36.1 °C (97 °F)   Resp 20   Wt 89 kg (196 lb 1.6 oz)   SpO2 97%   BMI 28.14 kg/m²    Pain Scale: 0 /10.      Toxicity Assessment          2024    14:00 2025    17:00   Toxicity Assessment   Adverse Events Reviewed (WDL)  No (Exceptions to WDL)   Treatment Site Prostate RT Prostate RT   Diarrhea  Grade 0   Fatigue  Grade 0   Hematuria  Grade 0   Urinary Incontinence Grade 1       weak stream, IPSS 3 Grade 0   Erectile Dysfunction Grade 2       viagra PRN    Urinary Frequency Grade 0 Grade 1       slight increase of urine frequency, about every  2 hours during day and nocturia x1 at night, not bothersome   Urinary Retention  Grade 0   Urinary Tract Obstruction  Grade 2       weak stream - flomax prescribed for patient   Urinary Tract Pain  Grade 0   Urinary Urgency Grade 0 Grade 1       increase in urine urgency, but not too bothersome      ASSESSMENT/PLAN:  The patient is tolerating radiation therapy as anticipated. Treatment complete, follow-up as scheduled.   Flomax prescribed  FUV with Fuad in mid June (after his family trip), PSA prior to FUV    Jasmina Duarte  , Radiation Oncology

## 2025-03-05 DIAGNOSIS — I10 BENIGN ESSENTIAL HYPERTENSION: ICD-10-CM

## 2025-03-06 NOTE — PROGRESS NOTES
RADIATION COMPLETION OF THERAPY NOTE    Patient Name:  Zan Reed  MRN:  [id number]  :  1955    Radiation Oncologist: Jasmina Duarte MD PhD  Primary Care Provider: Jakob Lr MD    Brief History: Zan Reed is a 69 y.o. male with Cancer Staging   Prostate cancer (Multi)  Staging form: Prostate, AJCC 8th Edition  - Clinical stage from 10/25/2024: cT1c, cN0, PSA: 4.7, Grade Group: 2 - Signed by Jasmina Duarte MD PhD on 2024  Prognostic indicators: Decipher 0.64 (high)      The patient completed radiotherapy as outlined below.    Radiation Treatment Summary:    Radiation Therapy    Treatment Period Technique Fraction Dose Fractions Total Dose   Course 1 2025-2025  (days elapsed: 10)         ProstSV 2025-2025 SBRT 800 / 800 cGy 5 / 5 4000 / 4,000 cGy     Concurrent Systemic Therapy: none    CTCAE Toxicity Overview:   Toxicity Assessment          2025    17:00   Toxicity Assessment   Adverse Events Reviewed (WDL) No (Exceptions to WDL)   Treatment Site Prostate RT   Diarrhea Grade 0   Fatigue Grade 0   Hematuria Grade 0   Urinary Incontinence Grade 0   Urinary Frequency Grade 1       slight increase of urine frequency, about every 2 hours during day and nocturia x1 at night, not bothersome   Urinary Retention Grade 0   Urinary Tract Obstruction Grade 2       weak stream - flomax prescribed for patient   Urinary Tract Pain Grade 0   Urinary Urgency Grade 1       increase in urine urgency, but not too bothersome     Patient Disposition: The patient is scheduled for follow up at our clinic on 25 with Sam Merida NP. The patient is encouraged to contact the radiation department for any questions or concerns in the interim.    Future Appointments   Date Time Provider Department Center   3/14/2025  5:45 PM Jakob Lr MD [id number] [facility]   2025  8:50 AM Wicho Doss MD [facility]   2025  3:00 PM Sam Merida, APRN-CNP [id number] [facility]    9/30/2025  8:45 AM Wicho Abbott MD [id number]

## 2025-03-07 RX ORDER — METOPROLOL TARTRATE 25 MG/1
TABLET, FILM COATED ORAL
Qty: 90 TABLET | Refills: 3 | Status: SHIPPED | OUTPATIENT
Start: 2025-03-07

## 2025-03-13 ENCOUNTER — APPOINTMENT (OUTPATIENT)
Dept: UROLOGY | Facility: HOSPITAL | Age: 70
End: 2025-03-13
Payer: MEDICARE

## 2025-03-14 ENCOUNTER — APPOINTMENT (OUTPATIENT)
Dept: PRIMARY CARE | Facility: CLINIC | Age: 70
End: 2025-03-14
Payer: MEDICARE

## 2025-03-14 VITALS — SYSTOLIC BLOOD PRESSURE: 130 MMHG | DIASTOLIC BLOOD PRESSURE: 76 MMHG

## 2025-03-14 DIAGNOSIS — R73.02 GLUCOSE INTOLERANCE (IMPAIRED GLUCOSE TOLERANCE): ICD-10-CM

## 2025-03-14 DIAGNOSIS — I10 BENIGN ESSENTIAL HYPERTENSION: ICD-10-CM

## 2025-03-14 DIAGNOSIS — H69.90 DYSFUNCTION OF EUSTACHIAN TUBE, UNSPECIFIED LATERALITY: ICD-10-CM

## 2025-03-14 DIAGNOSIS — E78.2 MIXED HYPERLIPIDEMIA: Primary | ICD-10-CM

## 2025-03-14 PROCEDURE — G2211 COMPLEX E/M VISIT ADD ON: HCPCS | Performed by: INTERNAL MEDICINE

## 2025-03-14 PROCEDURE — 3075F SYST BP GE 130 - 139MM HG: CPT | Performed by: INTERNAL MEDICINE

## 2025-03-14 PROCEDURE — 99214 OFFICE O/P EST MOD 30 MIN: CPT | Performed by: INTERNAL MEDICINE

## 2025-03-14 PROCEDURE — 3078F DIAST BP <80 MM HG: CPT | Performed by: INTERNAL MEDICINE

## 2025-03-14 ASSESSMENT — ENCOUNTER SYMPTOMS
HEMOPTYSIS: 0
HEARTBURN: 0
RHINORRHEA: 0
COUGH: 1
MYALGIAS: 0
WHEEZING: 0
WEIGHT LOSS: 0
HEADACHES: 0
CHILLS: 0
SWEATS: 0
SHORTNESS OF BREATH: 0
SORE THROAT: 0
FEVER: 0

## 2025-03-14 NOTE — PROGRESS NOTES
Subjective   Patient ID: Zan Reed is a 69 y.o. male who presents for No chief complaint on file..    HPI sick visit same day after hours no staff no chest pain no shortness of breath has had cough for several weeks that is not going away there is been no fever had some postnasal drip but seemingly in the past few days his gotten better in the meantime he has been treated for prostate cancer after low-grade PSA elevation had biopsy elected radiation therapy father had also had prostate cancer but  he thinks of bladder cancer or not on prostate related issue    Review of Systems    Objective   There were no vitals taken for this visit.    Physical Exam vital signs noted alert and oriented x 3 NCAT no coryza nares clear discharge OP benign TM normal bilateral EAC partial cerumen bilateral no AC nodes impression prostate cancer no JVD or bruit chest clear to auscultation CV regular rate and rhythm S1-S2 abdomen soft nontender normal active bowel sounds extremities no clubbing cyanosis or edema normal distal pulses    Impression hyperlipidemia bph/PCa  Assessment/Plan low urine flow eustachian tube dysfunction/chronic cough abating hypertension other diagnoses glucose intolerance  Plan discussed with urine stream since the prostate therapy and treatment he is currently on Flomax may use up to twice per day but follow-up with urology and consideration for other treatments good water consumption follow-up with his radiation therapy for the cough no specific intervention needed she has Tessalon Perles at home and this appears to be helpful has enough supply on hand stay well hydrated May use Tylenol try to avoid NSAIDs if there was bleeding after the prostate intervention continue with blood pressure management and medication watching diet for cho and saturated fats and recheck on lipid and A1c at follow up increase aerobic exercise as able Claritin 10 mg p.o. daily as needed for eustachian tube or sinus May  use Debrox for the ears then recheck for regular visit Endor if no better and/or additional blood work as above [id number]

## 2025-03-21 ENCOUNTER — APPOINTMENT (OUTPATIENT)
Dept: UROLOGY | Facility: HOSPITAL | Age: 70
End: 2025-03-21
Payer: MEDICARE

## 2025-03-26 NOTE — PROGRESS NOTES
HPI    69 y.o. male being seen with the following problem list:    Problem list:  BPH, incomplete emptying    BPH, incomplete emptying, referred by Dr. John for possible HoLEP.     Seen 6/23/23, PVR 246cc. Also with ED, ePSA with +Fhx PCa in his dad. Started on daily tadalafil. Comes in today for follow up, PVR 69cc after double voiding. Reports weak stream, some urgency and frequency, but mostly bothersome obstructive symptoms. Inclined to have his prostate surgically addressed. No UTIs, complete retention, hematuria. He is getting a hip replacement in 6 weeks. No cross-sectional imaging to determine prostate volume.     PSA history:   6/2023 - 3.87  7/2022 - 3.9 (18% free)  5/2022 - 5.61      11/30/23 - seen today for cystoscopy to evaluate his channel and to discuss surgery. Symptoms are fairly mild. Took 1 pill abx this AM given recent hip replacement. Cysto shows bilobar, approximately 60g prostate with a right lateral lobe that crosses the midline and obstructs the bladder neck.      3/22/24 - seen today for PVR + symptom check.  PVR 122cc. Voiding well, no urinary complaints today. Continues Cialis 5mg po qd.    03/26/25 -         Lab Results   Component Value Date    PSA 3.67 02/16/2024    PSA 3.85 06/17/2023    PSA 3.9 07/29/2022    PSA 5.61 (H) 05/07/2022    PSA 2.44 08/14/2020              Current Medications:  Current Outpatient Medications   Medication Sig Dispense Refill    acetaminophen (Tylenol 8 HOUR) 650 mg ER tablet Take 1 tablet (650 mg) by mouth every 8 hours if needed for mild pain (1 - 3). Do not crush, chew, or split.      albuterol 90 mcg/actuation inhaler Inhale if needed. (Patient not taking: Reported on 1/28/2025)      aspirin (Adult Low Dose Aspirin) 81 mg EC tablet Take 1 tablet (81 mg) by mouth once daily.      atorvastatin (Lipitor) 80 mg tablet Take 1 tablet (80 mg) by mouth once daily. 90 tablet 1    cholecalciferol, vitamin D3, (VITAMIN D3 ORAL) Take by mouth once daily.       clopidogrel (Plavix) 75 mg tablet Take 1 tablet (75 mg) by mouth once daily. 90 tablet 1    fluticasone propion-salmeteroL (Wixela Inhub) 100-50 mcg/dose diskus inhaler Inhale 1 puff every 12 hours. (Patient taking differently: Inhale 1 puff once daily.) 60 each 1    levothyroxine (LevoxyL) 100 mcg tablet Take by mouth.      metoprolol tartrate (Lopressor) 25 mg tablet  90 tablet 3    multivitamin tablet Take 1 tablet by mouth once daily.      naproxen (Naprosyn) 375 mg tablet Take 1 tablet (375 mg) by mouth if needed for mild pain (1 - 3).      nitroglycerin (Nitrostat) 0.4 mg SL tablet Place under the tongue. (Patient not taking: Reported on 1/28/2025)      sildenafil (Viagra) 100 mg tablet Take 1 tablet (100 mg) by mouth if needed for erectile dysfunction. 30 tablet 0    tadalafil (Cialis) 5 mg tablet Take 1 tablet (5 mg) by mouth once daily. 90 tablet 3    tamsulosin (Flomax) 0.4 mg 24 hr capsule Take 1 capsule (0.4 mg) by mouth once daily. 90 capsule 0     No current facility-administered medications for this visit.        Active Problems:  Zan Reed is a 69 y.o. male with the following Problems and Medications.  Patient Active Problem List   Diagnosis    Abnormality of femur    Acquired spondylolisthesis    Allergic reaction    Arthritis of hand, left, degenerative    Asthma    Baker cyst    Cellulitis of right upper extremity    Olecranon bursitis of right elbow    Chest pain    Chronic pain    Contusion of left forearm    Coronary artery disease    Cough    ED (erectile dysfunction)    Elevated PSA    Eye redness    Fall from ground level    Fatigue    Frequent urination    Glycosuria    H/O joint replacement    Hyperglycemia    Hyperlipidemia    Hypothyroidism    Insomnia    Left lumbar radiculopathy    Lumbar spinal stenosis    Mechanical low back pain    Osteoarthritis of both knees    Osteoarthrosis of knee    Prostatitis    Pain in joint, lower leg    Right ankle pain    Pain and swelling of  left wrist    Right elbow pain    Sacro-iliac pain    Sprain of anterior talofibular ligament of right ankle    Vitamin D deficiency    Hypothyroidism due to Hashimoto's thyroiditis    Mild intermittent asthma without complication (HHS-HCC)    Coronary artery disease involving native heart without angina pectoris    Chronic fatigue    Dermatosis    Arthritis of left hip    Blood glucose elevated    BPH with obstruction/lower urinary tract symptoms    Elevated fasting blood sugar    Incomplete emptying of bladder    Low back pain    Other low back pain    Overweight with body mass index (BMI) of 27 to 27.9 in adult    Traumatic hematoma of forearm, left, initial encounter    Status post total replacement of left hip    Acute myocardial infarction    Bronchitis    Disorder of skin or subcutaneous tissue    Palpitations    Prostate cancer (Multi)    Malignant neoplasm of prostate (Multi)     Current Outpatient Medications   Medication Sig Dispense Refill    acetaminophen (Tylenol 8 HOUR) 650 mg ER tablet Take 1 tablet (650 mg) by mouth every 8 hours if needed for mild pain (1 - 3). Do not crush, chew, or split.      albuterol 90 mcg/actuation inhaler Inhale if needed. (Patient not taking: Reported on 1/28/2025)      aspirin (Adult Low Dose Aspirin) 81 mg EC tablet Take 1 tablet (81 mg) by mouth once daily.      atorvastatin (Lipitor) 80 mg tablet Take 1 tablet (80 mg) by mouth once daily. 90 tablet 1    cholecalciferol, vitamin D3, (VITAMIN D3 ORAL) Take by mouth once daily.      clopidogrel (Plavix) 75 mg tablet Take 1 tablet (75 mg) by mouth once daily. 90 tablet 1    fluticasone propion-salmeteroL (Wixela Inhub) 100-50 mcg/dose diskus inhaler Inhale 1 puff every 12 hours. (Patient taking differently: Inhale 1 puff once daily.) 60 each 1    levothyroxine (LevoxyL) 100 mcg tablet Take by mouth.      metoprolol tartrate (Lopressor) 25 mg tablet  90 tablet 3    multivitamin tablet Take 1 tablet by mouth once daily.       naproxen (Naprosyn) 375 mg tablet Take 1 tablet (375 mg) by mouth if needed for mild pain (1 - 3).      nitroglycerin (Nitrostat) 0.4 mg SL tablet Place under the tongue. (Patient not taking: Reported on 1/28/2025)      sildenafil (Viagra) 100 mg tablet Take 1 tablet (100 mg) by mouth if needed for erectile dysfunction. 30 tablet 0    tadalafil (Cialis) 5 mg tablet Take 1 tablet (5 mg) by mouth once daily. 90 tablet 3    tamsulosin (Flomax) 0.4 mg 24 hr capsule Take 1 capsule (0.4 mg) by mouth once daily. 90 capsule 0     No current facility-administered medications for this visit.       PMH:  Past Medical History:   Diagnosis Date    Acute myocardial infarction, unspecified 02/18/2015    AMI (acute myocardial infarction)    Asthma     mild, well controlled, used rescue inhaler last 1 month ago due to over excertion    Benign prostatic hyperplasia 08/2024    Elevated PSA 08/2024    Erectile dysfunction 2022    Heart disease 2017    stent in 2016, no issues on plavix- stop and clearance recieved from dr. Abbott in note from 9/24/24    Hypercholesteremia     Hyperglycemia     A1c= 6% on 8/9/24    Hypertension     Hypothyroidism     OA (osteoarthritis)     Personal history of other specified conditions 05/08/2022    History of elevated prostate specific antigen (PSA)    Prostate cancer (Multi) 11/2024    Sacrococcygeal disorders, not elsewhere classified 05/12/2015    Sacro-iliac pain, per patient: all back pain resolved after THR    Vitamin D deficiency        PSH:  Past Surgical History:   Procedure Laterality Date    CORONARY ANGIOPLASTY WITH STENT PLACEMENT  05/17/2016    Cath Stent Placement    OTHER SURGICAL HISTORY  05/02/2014    Secondary Repair Of Ruptured Achilles Tendon    OTHER SURGICAL HISTORY Left 05/02/2014    Tenodesis Of Biceps Tendon At Elbow    TOTAL HIP ARTHROPLASTY Left     TOTAL KNEE ARTHROPLASTY Bilateral     US GUIDED ASPIRATION OF ABSCESS, HEMATOMA, CYST  08/14/2015    US GUIDED ASPIRATION  OF ABSCESS, HEMATOMA, CYST 2015 [facility] ANCILLARY LEGACY       FMH:  Family History   Problem Relation Name Age of Onset    Diverticulosis Mother      Diabetes Father Migel Reed ()     Prostate cancer Father Migel Reed ()     Cancer Father Migel Reed ()     Coronary artery disease Other Grandparent     Cancer Other      Diabetes Other         SHx:  Social History     Tobacco Use    Smoking status: Never    Smokeless tobacco: Never   Vaping Use    Vaping status: Never Used   Substance Use Topics    Alcohol use: Yes     Alcohol/week: 3.0 standard drinks of alcohol     Types: 3 Cans of beer per week     Comment: per week    Drug use: Never       Allergies:  Allergies   Allergen Reactions    Cat Dander Other     Sneezing, congestion    Cat's Claw Other    Clindamycin Rash    Penicillins Rash and Other     Hasn't happened since he was a little child, thinks rash and swelling were the reactions       Physical Exam:      Assessment/Plan      Scribe Attestation  By signing my name below, I, Zacariasoctavio Corea, Scribe, attest that this documentation has been prepared under the direction and in the presence of Wicho Doss MD.

## 2025-03-28 ENCOUNTER — APPOINTMENT (OUTPATIENT)
Dept: UROLOGY | Facility: HOSPITAL | Age: 70
End: 2025-03-28
Payer: MEDICARE

## 2025-04-02 ENCOUNTER — APPOINTMENT (OUTPATIENT)
Dept: UROLOGY | Facility: HOSPITAL | Age: 70
End: 2025-04-02
Payer: COMMERCIAL

## 2025-04-15 DIAGNOSIS — J45.909 UNCOMPLICATED ASTHMA, UNSPECIFIED ASTHMA SEVERITY, UNSPECIFIED WHETHER PERSISTENT (HHS-HCC): ICD-10-CM

## 2025-04-15 RX ORDER — FLUTICASONE PROPIONATE AND SALMETEROL 100; 50 UG/1; UG/1
1 POWDER RESPIRATORY (INHALATION) EVERY 12 HOURS
Qty: 60 EACH | Refills: 3 | Status: SHIPPED | OUTPATIENT
Start: 2025-04-15 | End: 2025-04-16 | Stop reason: SDUPTHER

## 2025-04-16 DIAGNOSIS — J45.909 UNCOMPLICATED ASTHMA, UNSPECIFIED ASTHMA SEVERITY, UNSPECIFIED WHETHER PERSISTENT (HHS-HCC): ICD-10-CM

## 2025-04-17 RX ORDER — FLUTICASONE PROPIONATE AND SALMETEROL 100; 50 UG/1; UG/1
1 POWDER RESPIRATORY (INHALATION) EVERY 12 HOURS
Qty: 60 EACH | Refills: 3 | Status: SHIPPED | OUTPATIENT
Start: 2025-04-17

## 2025-04-21 ENCOUNTER — TELEPHONE (OUTPATIENT)
Dept: RADIATION ONCOLOGY | Facility: HOSPITAL | Age: 70
End: 2025-04-21
Payer: MEDICARE

## 2025-05-14 DIAGNOSIS — N13.8 BPH WITH OBSTRUCTION/LOWER URINARY TRACT SYMPTOMS: ICD-10-CM

## 2025-05-14 DIAGNOSIS — N40.1 BPH WITH OBSTRUCTION/LOWER URINARY TRACT SYMPTOMS: ICD-10-CM

## 2025-05-15 RX ORDER — TADALAFIL 5 MG/1
5 TABLET ORAL DAILY
Qty: 90 TABLET | Refills: 3 | Status: SHIPPED | OUTPATIENT
Start: 2025-05-15

## 2025-05-27 DIAGNOSIS — R39.12 WEAK URINARY STREAM: ICD-10-CM

## 2025-05-27 RX ORDER — TAMSULOSIN HYDROCHLORIDE 0.4 MG/1
0.4 CAPSULE ORAL DAILY
Qty: 30 CAPSULE | Refills: 2 | Status: SHIPPED | OUTPATIENT
Start: 2025-05-27 | End: 2025-08-25

## 2025-05-28 ENCOUNTER — APPOINTMENT (OUTPATIENT)
Dept: RADIATION ONCOLOGY | Facility: HOSPITAL | Age: 70
End: 2025-05-28
Payer: MEDICARE

## 2025-06-03 ENCOUNTER — APPOINTMENT (OUTPATIENT)
Dept: RADIATION ONCOLOGY | Facility: HOSPITAL | Age: 70
End: 2025-06-03
Payer: MEDICARE

## 2025-06-16 ENCOUNTER — TELEPHONE (OUTPATIENT)
Dept: RADIATION ONCOLOGY | Facility: HOSPITAL | Age: 70
End: 2025-06-16
Payer: MEDICARE

## 2025-06-16 NOTE — PROGRESS NOTES
Cancer synopsis:  Rad/onc: Fuad BROWNE    70 y/o male w/ cT1c, cN0, PSA: 4.7, Grade Group: 2     Prostate ca hx:  8/14/2020 - PSA 2.44  5/7/2022 - PSA 5.61  7/29/2022 - PSA 3.9  6/17/2023 - PSA 3.85  8/9/2024 - PSA 4.69  9/10/2024 - MRI prostate demonstrates PI RADS 4 lesion.   10/25/2024 - Prostate biopsy by Dr. John. Edwards 3+4=7, GG2, 2/12 cores+ (left base), 1/1 targeted core, +PNI, Decipher 0.64    SBRT to prostate and SV 02/28/2025    History of presenting illness:    Patient ID: [id number]     Zan Reed is a 69 y.o. male who presents for his FIR prostate ca cT1c, cN0, PSA: 4.7, Grade Group: 2  decipher 0.64 now s/p SBRT alone.    RT Site: prostate and SV  RT Date: 02/28/2025  Hormone therapy: no  Hot Flushes: Denies  Fatigue: Denies  Bone pain: Denies  ED: Reports some difficulty obtaining erections. States has been an issue prior to RT however slightly worse.  - Quality of erections during the last 4 weeks: 3 = Firm enough for masturbation and foreplay only  - Use of erectile dysfunction medications:  Viagra 100mg  IPSS: assigned to patient to complete  Urinary symptoms: Denies leakage, frequency and urgent. Does report some incomplete bladder emptying and weak stream that was an issue prior to RT and was using flowmax daily 0.4mg. Denies dysuria.  Urinary Medications: cialis 5mg, flowmax 0.4mg daily  Rectal bleeding: Denies  Colonoscopy: 01/12/2018 diverticulosis noted next in 10yrs  Other systems: Denies SOB, CP or fever    Review of systems:  Review of Systems   Constitutional:  Negative for fatigue, fever and unexpected weight change.   Respiratory:  Negative for cough, chest tightness and shortness of breath.    Cardiovascular:  Negative for chest pain, palpitations and leg swelling.   Gastrointestinal:  Negative for abdominal pain, anal bleeding, blood in stool, constipation, diarrhea and rectal pain.   Endocrine: Negative for cold intolerance, heat intolerance and polyuria.    Genitourinary:  Negative for decreased urine volume, difficulty urinating, dysuria, frequency, hematuria and urgency.   Musculoskeletal:  Negative for arthralgias, back pain, gait problem and joint swelling.   Skin: Negative.    Allergic/Immunologic: Negative.    Neurological:  Negative for dizziness, syncope and weakness.   Psychiatric/Behavioral: Negative.       PERFORMANCE STATUS:  KPS/ECO, Fully active, able to carry on all pre-disease performed without restriction (ECOG equivalent 0)    Past Medical history  Medical History[1]     Surgical/family history  Family History[2]   Surgical History[3]     Social History  Tobacco Use: Low Risk  (1/15/2025)    Patient History     Smoking Tobacco Use: Never     Smokeless Tobacco Use: Never     Passive Exposure: Not on file       Current med list:  Current Outpatient Medications   Medication Instructions    acetaminophen (TYLENOL 8 HOUR) 650 mg, Every 8 hours PRN    albuterol 90 mcg/actuation inhaler As needed    aspirin (Adult Low Dose Aspirin) 81 mg EC tablet 1 tablet, Daily    atorvastatin (LIPITOR) 80 mg, oral, Daily    cholecalciferol, vitamin D3, (VITAMIN D3 ORAL) Daily    clopidogrel (PLAVIX) 75 mg, oral, Daily    fluticasone propion-salmeteroL (Wixela Inhub) 100-50 mcg/dose diskus inhaler 1 puff, inhalation, Every 12 hours    levothyroxine (LevoxyL) 100 mcg tablet Take by mouth.    metoprolol tartrate (Lopressor) 25 mg tablet No dose, route, or frequency recorded.    multivitamin tablet 1 tablet, Daily    naproxen (NAPROSYN) 375 mg, As needed    nitroglycerin (Nitrostat) 0.4 mg SL tablet Place under the tongue.    sildenafil (VIAGRA) 100 mg, oral, As needed    tadalafil (CIALIS) 5 mg, oral, Daily    tamsulosin (FLOMAX) 0.8 mg, oral, Daily      Last recorded vital:  Virtual    Physical exam  Virtual    Pertinent labs:  PSA, TOTAL   Date/Time Value Ref Range Status   2025 03:19 PM 1.96 < OR = 4.00 ng/mL Final     Comment:     The total PSA value from  this assay system is   standardized against the WHO standard. The test   result will be approximately 20% lower when compared   to the equimolar-standardized total PSA (Coleen   Kenna). Comparison of serial PSA results should be   interpreted with this fact in mind.     This test was performed using the Siemens   chemiluminescent method. Values obtained from   different assay methods cannot be used  interchangeably. PSA levels, regardless of  value, should not be interpreted as absolute  evidence of the presence or absence of disease.       Dx:  Problem List Items Addressed This Visit       Malignant neoplasm of prostate (Multi) - Primary    Relevant Orders    Clinic Appointment Request Follow Up; SAM BALL; [facility] [address] (Completed)     Other Visit Diagnoses         Weak urinary stream        Relevant Medications    tamsulosin (Flomax) 0.4 mg 24 hr capsule        Psa 1.96 and declining nicely after. Review of latent SE including rectal bleeding, hematuria, urinary strictures, ED where reviewed as well as how to contact office if s/s present. Denies latent SE. NCCN guidelines where reviewed and routine FUV of every 3m for first year and every 6m for four years for a total of five years was discussed. Patient verbalized understanding.     BPH:  Discussed weak stream. Given prostate size not surprising and that there are various treatments including flowmax, which pt is already on however increased dosage up to urolifts. Would like to manage medically and increased flowmax to BID at this time.    ED:  Discussed continued sildenafil at 100mg. RT may slightly accelrate ED as seen in studies with SBRT. If worsening and would like to discuss trimix options can arrange with urology referral     PLAN:  FUV 4m  Labs Psa today and in 4m  Imaging none  Flowmax BID  FUV other providers: PCP for routine evals    Please contact office with any concerns:  [facility]  [phone]    15 minutes face to  face communication and 10 minutes in chart review and charting.    I performed this visit using realtime telehealth tools, including an audio/video OR telephone connection between patient’s name and location aZn Reed and Sam Merida AOCNP.    2. POS 10: Telehealth provided in patient's home.  o Patient is located in their home (which is a location other than a hospital or other  facility where the patient receives care in a private residence) when receiving  health services or health related services through telecommunication technology.         [1]   Past Medical History:  Diagnosis Date    Acute myocardial infarction, unspecified 2015    AMI (acute myocardial infarction)    Asthma     mild, well controlled, used rescue inhaler last 1 month ago due to over excertion    Benign prostatic hyperplasia 2024    Elevated PSA 2024    Erectile dysfunction     Heart disease 2017    stent in , no issues on plavix- stop and clearance recieved from dr. Abbott in note from 24    Hypercholesteremia     Hyperglycemia     A1c= 6% on 24    Hypertension     Hypothyroidism     OA (osteoarthritis)     Personal history of other specified conditions 2022    History of elevated prostate specific antigen (PSA)    Prostate cancer (Multi) 2024    Sacrococcygeal disorders, not elsewhere classified 2015    Sacro-iliac pain, per patient: all back pain resolved after THR    Vitamin D deficiency    [2]   Family History  Problem Relation Name Age of Onset    Diverticulosis Mother      Diabetes Father Migel Reed ()     Prostate cancer Father Migel Reed ()     Cancer Father Migel Reed ()     Coronary artery disease Other Grandparent     Cancer Other      Diabetes Other     [3]   Past Surgical History:  Procedure Laterality Date    CORONARY ANGIOPLASTY WITH STENT PLACEMENT  2016    Cath Stent Placement    OTHER SURGICAL HISTORY  2014     Secondary Repair Of Ruptured Achilles Tendon    OTHER SURGICAL HISTORY Left 05/02/2014    Tenodesis Of Biceps Tendon At Elbow    TOTAL HIP ARTHROPLASTY Left     TOTAL KNEE ARTHROPLASTY Bilateral     US GUIDED ASPIRATION OF ABSCESS, HEMATOMA, CYST  08/14/2015    US GUIDED ASPIRATION OF ABSCESS, HEMATOMA, CYST 8/14/2015 [facility] ANCILLARY LEGACY

## 2025-06-17 ENCOUNTER — HOSPITAL ENCOUNTER (OUTPATIENT)
Dept: RADIATION ONCOLOGY | Facility: HOSPITAL | Age: 70
Setting detail: RADIATION/ONCOLOGY SERIES
Discharge: HOME | End: 2025-06-17
Payer: COMMERCIAL

## 2025-06-17 DIAGNOSIS — C61 MALIGNANT NEOPLASM OF PROSTATE (MULTI): Primary | ICD-10-CM

## 2025-06-17 DIAGNOSIS — R39.12 WEAK URINARY STREAM: ICD-10-CM

## 2025-06-17 DIAGNOSIS — E78.5 HYPERLIPIDEMIA, UNSPECIFIED HYPERLIPIDEMIA TYPE: ICD-10-CM

## 2025-06-17 LAB — PSA SERPL-MCNC: 1.96 NG/ML

## 2025-06-17 PROCEDURE — 99214 OFFICE O/P EST MOD 30 MIN: CPT

## 2025-06-17 PROCEDURE — 99213 OFFICE O/P EST LOW 20 MIN: CPT | Mod: 95

## 2025-06-17 PROCEDURE — 99214 OFFICE O/P EST MOD 30 MIN: CPT | Mod: 95

## 2025-06-17 RX ORDER — TAMSULOSIN HYDROCHLORIDE 0.4 MG/1
0.8 CAPSULE ORAL DAILY
Qty: 60 CAPSULE | Refills: 2 | Status: SHIPPED | OUTPATIENT
Start: 2025-06-17 | End: 2025-09-15

## 2025-06-17 ASSESSMENT — ENCOUNTER SYMPTOMS
DIFFICULTY URINATING: 0
FEVER: 0
UNEXPECTED WEIGHT CHANGE: 0
WEAKNESS: 0
JOINT SWELLING: 0
ABDOMINAL PAIN: 0
PALPITATIONS: 0
BLOOD IN STOOL: 0
COUGH: 0
CHEST TIGHTNESS: 0
ALLERGIC/IMMUNOLOGIC NEGATIVE: 1
ANAL BLEEDING: 0
CONSTIPATION: 0
ARTHRALGIAS: 0
DIARRHEA: 0
SHORTNESS OF BREATH: 0
BACK PAIN: 0
PSYCHIATRIC NEGATIVE: 1
FATIGUE: 0
RECTAL PAIN: 0
HEMATURIA: 0
DIZZINESS: 0
FREQUENCY: 0
DYSURIA: 0

## 2025-06-21 RX ORDER — ATORVASTATIN CALCIUM 80 MG/1
80 TABLET, FILM COATED ORAL DAILY
Qty: 90 TABLET | Refills: 1 | Status: SHIPPED | OUTPATIENT
Start: 2025-06-21 | End: 2026-06-21

## 2025-07-16 ENCOUNTER — OFFICE VISIT (OUTPATIENT)
Dept: URGENT CARE | Age: 70
End: 2025-07-16
Payer: MEDICARE

## 2025-07-16 VITALS
OXYGEN SATURATION: 99 % | HEIGHT: 70 IN | DIASTOLIC BLOOD PRESSURE: 79 MMHG | BODY MASS INDEX: 28.06 KG/M2 | SYSTOLIC BLOOD PRESSURE: 144 MMHG | TEMPERATURE: 99.2 F | HEART RATE: 68 BPM | RESPIRATION RATE: 19 BRPM | WEIGHT: 196 LBS

## 2025-07-16 DIAGNOSIS — R09.89 RUNNY NOSE: ICD-10-CM

## 2025-07-16 LAB
POC CORONAVIRUS SARS-COV-2 PCR: POSITIVE
POC HUMAN RHINOVIRUS PCR: NEGATIVE
POC INFLUENZA A VIRUS PCR: NEGATIVE
POC INFLUENZA B VIRUS PCR: NEGATIVE
POC RESPIRATORY SYNCYTIAL VIRUS PCR: NEGATIVE

## 2025-07-16 ASSESSMENT — ENCOUNTER SYMPTOMS
SINUS PRESSURE: 1
FEVER: 1
FATIGUE: 1

## 2025-07-16 NOTE — PROGRESS NOTES
"Subjective   Patient ID: Zan Reed is a 69 y.o. male. They present today with a chief complaint of Fatigue (Very fatigued and high blood pressure x yesterday. Pt. Explains he feels very tired. ).    History of Present Illness  HPI    69-year-old male patient presents today for concerns of increased fatigue and elevated blood pressure.  He states that his sister was very concerned and came over to take his blood pressure with her home blood pressure cuff, where it read systolically in the 140s-he states that his blood pressure normally runs in the 130s systolically.  He also states that he started experiencing increased generalized fatigue yesterday, as well as increased sinus congestion.  He states that he has been taking DayQuil/NyQuil to help with his symptoms.  He is here for evaluation.    Past Medical History  Allergies as of 07/16/2025 - Reviewed 07/16/2025   Allergen Reaction Noted    Cat dander Other 04/06/2016    Cat's claw Other 08/01/2023    Clindamycin Rash 08/01/2023    Penicillins Rash and Other 03/30/2016       Prescriptions Prior to Admission[1]     Medical History[2]    Surgical History[3]     reports that he has never smoked. He has never used smokeless tobacco. He reports current alcohol use of about 3.0 standard drinks of alcohol per week. He reports that he does not use drugs.    Review of Systems  Review of Systems   Constitutional:  Positive for fatigue and fever.   HENT:  Positive for congestion and sinus pressure.                                   Objective    Vitals:    07/16/25 1551   BP: 144/79   BP Location: Left arm   Patient Position: Sitting   BP Cuff Size: Small adult   Pulse: 68   Resp: 19   Temp: 37.3 °C (99.2 °F)   TempSrc: Oral   SpO2: 99%   Weight: 88.9 kg (196 lb)   Height: 1.778 m (5' 10\")     No LMP for male patient.    Physical Exam  HENT:      Nose: Congestion present.     Cardiovascular:      Rate and Rhythm: Normal rate and regular rhythm.      Pulses: Normal " pulses.      Heart sounds: Normal heart sounds.   Pulmonary:      Effort: Pulmonary effort is normal.      Breath sounds: Normal breath sounds.         Procedures    Point of Care Test & Imaging Results from this visit  Results for orders placed or performed in visit on 07/16/25   POCT SPOTFIRE R/ST Panel Mini w/COVID ([facility]) manually resulted    Specimen: Swab   Result Value Ref Range    POC Sars-Cov-2 PCR Positive (A) Negative    POC Respiratory Syncytial Virus PCR Negative Negative    POC Influenza A Virus PCR Negative Negative    POC Influenza B Virus PCR Negative Negative    POC Human Rhinovirus PCR Negative Negative      Imaging  No results found.    Cardiology, Vascular, and Other Imaging  No other imaging results found for the past 2 days      Diagnostic study results (if any) were reviewed by NAVEEN Edwards.    Assessment/Plan   Allergies, medications, history, and pertinent labs/EKGs/Imaging reviewed by NAVEEN Edwards.     Medical Decision Making  Spotfire covid completed in clinic and resulted positive for COVID; results discussed with patient and education provided.  Recommend symptomatic treatment at this time-alternate between Tylenol and ibuprofen to help with the discomfort, maintain hydration, and use a cool-mist humidifier to help with symptoms.  Patient is hemodynamically stable and in no acute distress.  Discussed with patient red flag symptoms and when to report to the emergency department if necessary. As a result of the work-up, the patient was discharged home.  he was informed of his diagnosis and instructed to come back with any concerns or worsening of condition.  he and was agreeable to the plan as discussed above.  he was given the opportunity to ask questions.  All of the patient's questions were answered.    Orders and Diagnoses  Diagnoses and all orders for this visit:  Runny nose  -     POCT SPOTFIRE R/ST [facility]  manually resulted      Medical Admin Record      Patient disposition: Home    Electronically signed by NAVEEN Edwards  5:12 PM           [1] (Not in a hospital admission)   [2]   Past Medical History:  Diagnosis Date    Acute myocardial infarction, unspecified 02/18/2015    AMI (acute myocardial infarction)    Asthma     mild, well controlled, used rescue inhaler last 1 month ago due to over excertion    Benign prostatic hyperplasia 08/2024    Elevated PSA 08/2024    Erectile dysfunction 2022    Heart disease 2017    stent in 2016, no issues on plavix- stop and clearance recieved from dr. Abbott in note from 9/24/24    Hypercholesteremia     Hyperglycemia     A1c= 6% on 8/9/24    Hypertension     Hypothyroidism     OA (osteoarthritis)     Personal history of other specified conditions 05/08/2022    History of elevated prostate specific antigen (PSA)    Prostate cancer (Multi) 11/2024    Sacrococcygeal disorders, not elsewhere classified 05/12/2015    Sacro-iliac pain, per patient: all back pain resolved after THR    Vitamin D deficiency    [3]   Past Surgical History:  Procedure Laterality Date    CORONARY ANGIOPLASTY WITH STENT PLACEMENT  05/17/2016    Cath Stent Placement    OTHER SURGICAL HISTORY  05/02/2014    Secondary Repair Of Ruptured Achilles Tendon    OTHER SURGICAL HISTORY Left 05/02/2014    Tenodesis Of Biceps Tendon At Elbow    TOTAL HIP ARTHROPLASTY Left     TOTAL KNEE ARTHROPLASTY Bilateral     US GUIDED ASPIRATION OF ABSCESS, HEMATOMA, CYST  08/14/2015    US GUIDED ASPIRATION OF ABSCESS, HEMATOMA, CYST 8/14/2015 [facility]

## 2025-09-23 ENCOUNTER — APPOINTMENT (OUTPATIENT)
Dept: CARDIOLOGY | Facility: HOSPITAL | Age: 70
End: 2025-09-23
Payer: MEDICARE

## 2025-09-30 ENCOUNTER — APPOINTMENT (OUTPATIENT)
Dept: CARDIOLOGY | Facility: HOSPITAL | Age: 70
End: 2025-09-30
Payer: MEDICARE

## 2026-01-15 ENCOUNTER — APPOINTMENT (OUTPATIENT)
Dept: UROLOGY | Facility: HOSPITAL | Age: 71
End: 2026-01-15
Payer: MEDICARE

## (undated) DEVICE — SPINAL NEEDLE, 22G, 6"

## (undated) DEVICE — NEEDLE, HYPODERMIC, REGULAR WALL, REGULAR BEVEL, 22 G X 1.5 IN

## (undated) DEVICE — PROBE COVER, ULTRASOUND 8818

## (undated) DEVICE — MARKER, SKIN, DUAL TIP INK W/9 LABEL AND REMOVABLE TIME OUT SLEEVE

## (undated) DEVICE — SYRINGE, 20 CC, LUER LOCK, MONOJECT, W/O CAP, LF

## (undated) DEVICE — APPLICATOR, CHLORAPREP, W/ORANGE TINT, 26ML

## (undated) DEVICE — DRAPE, SHEET, THREE QUARTER, FAN FOLD, 57 X 77 IN

## (undated) DEVICE — CONTAINER, SPECIMEN, 120 ML, STERILE

## (undated) DEVICE — NEEDLE, SPINAL, QUINCKE, LUER LOCK, 18 G X 6 IN

## (undated) DEVICE — DRESSING, GAUZE, 16 PLY, 4 X 4 IN, STERILE